# Patient Record
Sex: FEMALE | Race: WHITE | Employment: OTHER | URBAN - METROPOLITAN AREA
[De-identification: names, ages, dates, MRNs, and addresses within clinical notes are randomized per-mention and may not be internally consistent; named-entity substitution may affect disease eponyms.]

---

## 2017-01-06 LAB — INR BLD: 3.5

## 2017-01-09 ENCOUNTER — OFFICE VISIT (OUTPATIENT)
Dept: INTERNAL MEDICINE CLINIC | Age: 57
End: 2017-01-09

## 2017-01-09 VITALS
SYSTOLIC BLOOD PRESSURE: 122 MMHG | DIASTOLIC BLOOD PRESSURE: 82 MMHG | BODY MASS INDEX: 25.86 KG/M2 | OXYGEN SATURATION: 95 % | WEIGHT: 146 LBS | HEART RATE: 75 BPM

## 2017-01-09 DIAGNOSIS — Z79.01 ANTICOAGULANT LONG-TERM USE: ICD-10-CM

## 2017-01-09 DIAGNOSIS — M54.50 LOW BACK PAIN WITHOUT SCIATICA, UNSPECIFIED BACK PAIN LATERALITY, UNSPECIFIED CHRONICITY: Primary | ICD-10-CM

## 2017-01-09 PROCEDURE — 99213 OFFICE O/P EST LOW 20 MIN: CPT | Performed by: INTERNAL MEDICINE

## 2017-01-09 RX ORDER — CYCLOBENZAPRINE HCL 10 MG
10 TABLET ORAL EVERY 8 HOURS PRN
Qty: 30 TABLET | Refills: 0 | Status: SHIPPED | OUTPATIENT
Start: 2017-01-09 | End: 2017-01-19

## 2017-01-09 ASSESSMENT — ENCOUNTER SYMPTOMS
COUGH: 0
VOMITING: 0
CHEST TIGHTNESS: 0
NAUSEA: 0
SHORTNESS OF BREATH: 0
WHEEZING: 0
BACK PAIN: 1
CONSTIPATION: 0
ABDOMINAL DISTENTION: 0
DIARRHEA: 0

## 2017-01-10 ENCOUNTER — ANTI-COAG VISIT (OUTPATIENT)
Dept: CARDIOLOGY CLINIC | Age: 57
End: 2017-01-10

## 2017-01-17 RX ORDER — GABAPENTIN 300 MG/1
CAPSULE ORAL
Qty: 180 CAPSULE | Refills: 3 | Status: SHIPPED | OUTPATIENT
Start: 2017-01-17 | End: 2018-02-07 | Stop reason: SDUPTHER

## 2017-01-21 LAB — INR BLD: 2.5

## 2017-01-23 RX ORDER — WARFARIN SODIUM 2 MG/1
TABLET ORAL
Qty: 30 TABLET | Refills: 1 | Status: SHIPPED | OUTPATIENT
Start: 2017-01-23 | End: 2017-02-23 | Stop reason: SDUPTHER

## 2017-01-25 ENCOUNTER — ANTI-COAG VISIT (OUTPATIENT)
Dept: CARDIOLOGY CLINIC | Age: 57
End: 2017-01-25

## 2017-01-30 RX ORDER — SIMVASTATIN 40 MG
TABLET ORAL
Qty: 90 TABLET | Refills: 3 | Status: SHIPPED | OUTPATIENT
Start: 2017-01-30 | End: 2018-02-12 | Stop reason: SDUPTHER

## 2017-02-03 ENCOUNTER — ANTI-COAG VISIT (OUTPATIENT)
Dept: CARDIOLOGY CLINIC | Age: 57
End: 2017-02-03

## 2017-02-15 RX ORDER — ASPIRIN 325 MG
TABLET, DELAYED RELEASE (ENTERIC COATED) ORAL
Qty: 90 TABLET | Refills: 3 | Status: SHIPPED | OUTPATIENT
Start: 2017-02-15 | End: 2017-02-23 | Stop reason: SDUPTHER

## 2017-02-15 RX ORDER — NORTRIPTYLINE HYDROCHLORIDE 10 MG/1
CAPSULE ORAL
Qty: 270 CAPSULE | Refills: 3 | Status: SHIPPED | OUTPATIENT
Start: 2017-02-15 | End: 2017-08-24 | Stop reason: SDUPTHER

## 2017-02-18 LAB — INR BLD: 3.5

## 2017-02-22 ENCOUNTER — ANTI-COAG VISIT (OUTPATIENT)
Dept: CARDIOLOGY CLINIC | Age: 57
End: 2017-02-22

## 2017-02-23 ENCOUNTER — OFFICE VISIT (OUTPATIENT)
Dept: CARDIOLOGY CLINIC | Age: 57
End: 2017-02-23

## 2017-02-23 VITALS
HEART RATE: 58 BPM | WEIGHT: 148 LBS | BODY MASS INDEX: 26.22 KG/M2 | OXYGEN SATURATION: 98 % | HEIGHT: 63 IN | DIASTOLIC BLOOD PRESSURE: 80 MMHG | SYSTOLIC BLOOD PRESSURE: 124 MMHG

## 2017-02-23 DIAGNOSIS — Z95.2 H/O AORTIC VALVE REPLACEMENT: ICD-10-CM

## 2017-02-23 DIAGNOSIS — I10 ESSENTIAL HYPERTENSION: Primary | ICD-10-CM

## 2017-02-23 PROCEDURE — 99214 OFFICE O/P EST MOD 30 MIN: CPT | Performed by: INTERNAL MEDICINE

## 2017-02-23 RX ORDER — ASPIRIN 325 MG
TABLET, DELAYED RELEASE (ENTERIC COATED) ORAL
Qty: 90 TABLET | Refills: 3 | Status: SHIPPED | OUTPATIENT
Start: 2017-02-23 | End: 2018-12-03 | Stop reason: SDUPTHER

## 2017-02-23 RX ORDER — WARFARIN SODIUM 2 MG/1
TABLET ORAL
Qty: 90 TABLET | Refills: 1 | Status: SHIPPED | OUTPATIENT
Start: 2017-02-23 | End: 2019-01-01

## 2017-03-08 LAB — INR BLD: 3.5

## 2017-03-09 ENCOUNTER — TELEPHONE (OUTPATIENT)
Dept: CARDIOLOGY CLINIC | Age: 57
End: 2017-03-09

## 2017-03-09 ENCOUNTER — ANTI-COAG VISIT (OUTPATIENT)
Dept: CARDIOLOGY CLINIC | Age: 57
End: 2017-03-09

## 2017-03-20 RX ORDER — HYDROCHLOROTHIAZIDE 25 MG/1
TABLET ORAL
Qty: 90 TABLET | Refills: 1 | Status: SHIPPED | OUTPATIENT
Start: 2017-03-20 | End: 2017-09-23 | Stop reason: SDUPTHER

## 2017-03-20 RX ORDER — POTASSIUM CHLORIDE 750 MG/1
TABLET, EXTENDED RELEASE ORAL
Qty: 60 TABLET | Refills: 5 | Status: SHIPPED | OUTPATIENT
Start: 2017-03-20 | End: 2018-02-07 | Stop reason: SDUPTHER

## 2017-03-23 ENCOUNTER — ANTI-COAG VISIT (OUTPATIENT)
Dept: CARDIOLOGY CLINIC | Age: 57
End: 2017-03-23

## 2017-03-23 ENCOUNTER — TELEPHONE (OUTPATIENT)
Dept: CARDIOLOGY CLINIC | Age: 57
End: 2017-03-23

## 2017-03-23 LAB — INR BLD: 3.5

## 2017-04-07 ENCOUNTER — ANTI-COAG VISIT (OUTPATIENT)
Dept: CARDIOLOGY CLINIC | Age: 57
End: 2017-04-07

## 2017-04-07 ENCOUNTER — TELEPHONE (OUTPATIENT)
Dept: CARDIOLOGY CLINIC | Age: 57
End: 2017-04-07

## 2017-04-07 ENCOUNTER — OFFICE VISIT (OUTPATIENT)
Dept: INTERNAL MEDICINE CLINIC | Age: 57
End: 2017-04-07

## 2017-04-07 VITALS
BODY MASS INDEX: 26.39 KG/M2 | DIASTOLIC BLOOD PRESSURE: 80 MMHG | HEART RATE: 65 BPM | SYSTOLIC BLOOD PRESSURE: 120 MMHG | WEIGHT: 149 LBS | OXYGEN SATURATION: 98 %

## 2017-04-07 DIAGNOSIS — I10 ESSENTIAL HYPERTENSION: ICD-10-CM

## 2017-04-07 DIAGNOSIS — Z72.0 TOBACCO USE: ICD-10-CM

## 2017-04-07 DIAGNOSIS — M54.50 ACUTE RIGHT-SIDED LOW BACK PAIN WITHOUT SCIATICA: Primary | ICD-10-CM

## 2017-04-07 DIAGNOSIS — Z95.2 H/O AORTIC VALVE REPLACEMENT: ICD-10-CM

## 2017-04-07 LAB
INR BLD: 3.5
INR BLD: 3.5
PROTIME: NORMAL SECONDS

## 2017-04-07 PROCEDURE — 99214 OFFICE O/P EST MOD 30 MIN: CPT | Performed by: INTERNAL MEDICINE

## 2017-04-07 RX ORDER — METHYLPREDNISOLONE 4 MG/1
TABLET ORAL
Qty: 1 KIT | Refills: 0 | Status: SHIPPED | OUTPATIENT
Start: 2017-04-07 | End: 2017-04-13

## 2017-04-07 RX ORDER — METHOCARBAMOL 500 MG/1
500 TABLET, FILM COATED ORAL 4 TIMES DAILY
Qty: 40 TABLET | Refills: 0 | Status: SHIPPED | OUTPATIENT
Start: 2017-04-07 | End: 2017-04-17

## 2017-04-22 LAB — INR BLD: 3.5

## 2017-04-24 ENCOUNTER — ANTI-COAG VISIT (OUTPATIENT)
Dept: CARDIOLOGY CLINIC | Age: 57
End: 2017-04-24

## 2017-04-24 ENCOUNTER — TELEPHONE (OUTPATIENT)
Dept: CARDIOLOGY CLINIC | Age: 57
End: 2017-04-24

## 2017-05-03 RX ORDER — BUSPIRONE HYDROCHLORIDE 10 MG/1
TABLET ORAL
Qty: 45 TABLET | Refills: 5 | Status: SHIPPED | OUTPATIENT
Start: 2017-05-03 | End: 2018-01-30 | Stop reason: SDUPTHER

## 2017-05-08 LAB — INR BLD: 3.5

## 2017-05-09 ENCOUNTER — ANTI-COAG VISIT (OUTPATIENT)
Dept: CARDIOLOGY CLINIC | Age: 57
End: 2017-05-09

## 2017-05-09 ENCOUNTER — TELEPHONE (OUTPATIENT)
Dept: CARDIOLOGY CLINIC | Age: 57
End: 2017-05-09

## 2017-05-11 ENCOUNTER — OFFICE VISIT (OUTPATIENT)
Dept: INTERNAL MEDICINE CLINIC | Age: 57
End: 2017-05-11

## 2017-05-11 VITALS
WEIGHT: 148 LBS | DIASTOLIC BLOOD PRESSURE: 60 MMHG | BODY MASS INDEX: 26.22 KG/M2 | HEART RATE: 71 BPM | OXYGEN SATURATION: 93 % | HEIGHT: 63 IN | SYSTOLIC BLOOD PRESSURE: 138 MMHG

## 2017-05-11 DIAGNOSIS — I10 ESSENTIAL HYPERTENSION: Primary | ICD-10-CM

## 2017-05-11 DIAGNOSIS — Z72.0 TOBACCO USE: ICD-10-CM

## 2017-05-11 DIAGNOSIS — Z95.2 H/O AORTIC VALVE REPLACEMENT: ICD-10-CM

## 2017-05-11 DIAGNOSIS — E78.5 HYPERLIPIDEMIA, UNSPECIFIED HYPERLIPIDEMIA TYPE: ICD-10-CM

## 2017-05-11 PROCEDURE — 99213 OFFICE O/P EST LOW 20 MIN: CPT | Performed by: INTERNAL MEDICINE

## 2017-05-24 ENCOUNTER — TELEPHONE (OUTPATIENT)
Dept: CARDIOLOGY CLINIC | Age: 57
End: 2017-05-24

## 2017-05-24 ENCOUNTER — ANTI-COAG VISIT (OUTPATIENT)
Dept: CARDIOLOGY CLINIC | Age: 57
End: 2017-05-24

## 2017-05-24 LAB — INR BLD: 3.5

## 2017-06-08 LAB — INR BLD: 3.5

## 2017-06-09 ENCOUNTER — ANTI-COAG VISIT (OUTPATIENT)
Dept: CARDIOLOGY CLINIC | Age: 57
End: 2017-06-09

## 2017-06-09 ENCOUNTER — TELEPHONE (OUTPATIENT)
Dept: CARDIOLOGY CLINIC | Age: 57
End: 2017-06-09

## 2017-06-20 ENCOUNTER — TELEPHONE (OUTPATIENT)
Dept: CARDIOLOGY CLINIC | Age: 57
End: 2017-06-20

## 2017-06-20 ENCOUNTER — ANTI-COAG VISIT (OUTPATIENT)
Dept: CARDIOLOGY CLINIC | Age: 57
End: 2017-06-20

## 2017-06-20 LAB — INR BLD: 3.5

## 2017-06-29 ENCOUNTER — HOSPITAL ENCOUNTER (OUTPATIENT)
Dept: GENERAL RADIOLOGY | Age: 57
Discharge: HOME | End: 2017-06-29
Attending: INTERNAL MEDICINE | Admitting: INTERNAL MEDICINE

## 2017-06-29 DIAGNOSIS — E78.5 HYPERLIPIDEMIA, UNSPECIFIED HYPERLIPIDEMIA TYPE: ICD-10-CM

## 2017-06-29 DIAGNOSIS — I10 ESSENTIAL HYPERTENSION: ICD-10-CM

## 2017-06-29 DIAGNOSIS — Z95.2 H/O AORTIC VALVE REPLACEMENT: ICD-10-CM

## 2017-06-29 LAB
A/G RATIO: 1.6 (ref 1.1–2.2)
ALBUMIN SERPL-MCNC: 4.3 G/DL (ref 3.4–5)
ALP BLD-CCNC: 94 U/L (ref 40–129)
ALT SERPL-CCNC: 20 U/L (ref 10–40)
ANION GAP SERPL CALCULATED.3IONS-SCNC: 10 MMOL/L (ref 3–16)
AST SERPL-CCNC: 23 U/L (ref 15–37)
BILIRUB SERPL-MCNC: <0.2 MG/DL (ref 0–1)
BUN BLDV-MCNC: 16 MG/DL (ref 7–20)
CALCIUM SERPL-MCNC: 9.6 MG/DL (ref 8.3–10.6)
CHLORIDE BLD-SCNC: 101 MMOL/L (ref 99–110)
CHOLESTEROL, TOTAL: 152 MG/DL (ref 0–199)
CO2: 31 MMOL/L (ref 21–32)
CREAT SERPL-MCNC: 0.8 MG/DL (ref 0.6–1.1)
GFR AFRICAN AMERICAN: >60
GFR NON-AFRICAN AMERICAN: >60
GLOBULIN: 2.7 G/DL
GLUCOSE BLD-MCNC: 61 MG/DL (ref 70–99)
HCT VFR BLD CALC: 41 % (ref 36–48)
HDLC SERPL-MCNC: 36 MG/DL (ref 40–60)
HEMOGLOBIN: 13.4 G/DL (ref 12–16)
LDL CHOLESTEROL CALCULATED: 74 MG/DL
MCH RBC QN AUTO: 27 PG (ref 26–34)
MCHC RBC AUTO-ENTMCNC: 32.8 G/DL (ref 31–36)
MCV RBC AUTO: 82.5 FL (ref 80–100)
PDW BLD-RTO: 16.4 % (ref 12.4–15.4)
PLATELET # BLD: 182 K/UL (ref 135–450)
PMV BLD AUTO: 11.3 FL (ref 5–10.5)
POTASSIUM SERPL-SCNC: 3.8 MMOL/L (ref 3.5–5.1)
RBC # BLD: 4.97 M/UL (ref 4–5.2)
SODIUM BLD-SCNC: 142 MMOL/L (ref 136–145)
TOTAL PROTEIN: 7 G/DL (ref 6.4–8.2)
TRIGL SERPL-MCNC: 208 MG/DL (ref 0–150)
VLDLC SERPL CALC-MCNC: 42 MG/DL
WBC # BLD: 8.4 K/UL (ref 4–11)

## 2017-07-10 LAB — INR BLD: 3.5

## 2017-07-11 ENCOUNTER — ANTI-COAG VISIT (OUTPATIENT)
Dept: CARDIOLOGY CLINIC | Age: 57
End: 2017-07-11

## 2017-07-11 ENCOUNTER — TELEPHONE (OUTPATIENT)
Dept: CARDIOLOGY CLINIC | Age: 57
End: 2017-07-11

## 2017-07-25 LAB — INR BLD: 3.5

## 2017-07-27 ENCOUNTER — ANTI-COAG VISIT (OUTPATIENT)
Dept: CARDIOLOGY CLINIC | Age: 57
End: 2017-07-27

## 2017-08-01 ENCOUNTER — OFFICE VISIT (OUTPATIENT)
Dept: INTERNAL MEDICINE CLINIC | Age: 57
End: 2017-08-01

## 2017-08-01 VITALS
HEIGHT: 63 IN | OXYGEN SATURATION: 93 % | BODY MASS INDEX: 26.05 KG/M2 | WEIGHT: 147 LBS | DIASTOLIC BLOOD PRESSURE: 70 MMHG | HEART RATE: 62 BPM | SYSTOLIC BLOOD PRESSURE: 132 MMHG
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
## 2017-08-01 DIAGNOSIS — Z95.2 H/O AORTIC VALVE REPLACEMENT: ICD-10-CM

## 2017-08-01 DIAGNOSIS — Z72.0 TOBACCO USE: ICD-10-CM

## 2017-08-01 DIAGNOSIS — M25.561 ACUTE PAIN OF RIGHT KNEE: ICD-10-CM

## 2017-08-01 DIAGNOSIS — E78.5 HYPERLIPIDEMIA, UNSPECIFIED HYPERLIPIDEMIA TYPE: ICD-10-CM

## 2017-08-01 DIAGNOSIS — M25.551 PAIN OF RIGHT HIP JOINT: ICD-10-CM

## 2017-08-01 DIAGNOSIS — I10 ESSENTIAL HYPERTENSION: ICD-10-CM

## 2017-08-01 PROCEDURE — 99214 OFFICE O/P EST MOD 30 MIN: CPT | Performed by: INTERNAL MEDICINE

## 2017-08-01 RX ORDER — PREDNISONE 10 MG/1
10 TABLET ORAL DAILY
Qty: 40 TABLET | Refills: 0 | Status: SHIPPED | OUTPATIENT
Start: 2017-08-01 | End: 2017-11-10 | Stop reason: ALTCHOICE

## 2017-08-01 RX ORDER — TRAMADOL HYDROCHLORIDE 50 MG/1
50 TABLET ORAL EVERY 8 HOURS PRN
Qty: 20 TABLET | Refills: 0 | Status: SHIPPED | OUTPATIENT
Start: 2017-08-01 | End: 2017-08-11

## 2017-08-10 ENCOUNTER — ANTI-COAG VISIT (OUTPATIENT)
Dept: CARDIOLOGY CLINIC | Age: 57
End: 2017-08-10

## 2017-08-10 ENCOUNTER — TELEPHONE (OUTPATIENT)
Dept: CARDIOLOGY CLINIC | Age: 57
End: 2017-08-10

## 2017-08-10 LAB — INR BLD: 3.5

## 2017-08-20 LAB — INR BLD: 4.8

## 2017-08-21 ENCOUNTER — ANTI-COAG VISIT (OUTPATIENT)
Dept: CARDIOLOGY CLINIC | Age: 57
End: 2017-08-21

## 2017-08-21 ENCOUNTER — TELEPHONE (OUTPATIENT)
Dept: CARDIOLOGY CLINIC | Age: 57
End: 2017-08-21

## 2017-09-05 ENCOUNTER — TELEPHONE (OUTPATIENT)
Dept: CARDIOLOGY CLINIC | Age: 57
End: 2017-09-05

## 2017-09-05 LAB — INR BLD: 2.6

## 2017-09-06 ENCOUNTER — TELEPHONE (OUTPATIENT)
Dept: CARDIOLOGY CLINIC | Age: 57
End: 2017-09-06

## 2017-09-06 ENCOUNTER — ANTI-COAG VISIT (OUTPATIENT)
Dept: CARDIOLOGY CLINIC | Age: 57
End: 2017-09-06

## 2017-09-11 LAB — INR BLD: 3.1

## 2017-09-12 ENCOUNTER — ANTI-COAG VISIT (OUTPATIENT)
Dept: CARDIOLOGY CLINIC | Age: 57
End: 2017-09-12

## 2017-09-12 ENCOUNTER — TELEPHONE (OUTPATIENT)
Dept: CARDIOLOGY CLINIC | Age: 57
End: 2017-09-12

## 2017-09-21 LAB — INR BLD: 3.1

## 2017-09-22 ENCOUNTER — ANTI-COAG VISIT (OUTPATIENT)
Dept: CARDIOLOGY CLINIC | Age: 57
End: 2017-09-22

## 2017-09-25 RX ORDER — HYDROCHLOROTHIAZIDE 25 MG/1
TABLET ORAL
Qty: 90 TABLET | Refills: 1 | Status: SHIPPED | OUTPATIENT
Start: 2017-09-25 | End: 2018-04-01 | Stop reason: SDUPTHER

## 2017-09-26 RX ORDER — WARFARIN SODIUM 2 MG/1
TABLET ORAL
Qty: 90 TABLET | Refills: 0 | Status: SHIPPED | OUTPATIENT
Start: 2017-09-26 | End: 2017-12-31 | Stop reason: SDUPTHER

## 2017-10-01 LAB — INR BLD: 3.1

## 2017-10-02 ENCOUNTER — ANTI-COAG VISIT (OUTPATIENT)
Dept: CARDIOLOGY CLINIC | Age: 57
End: 2017-10-02

## 2017-10-02 ENCOUNTER — TELEPHONE (OUTPATIENT)
Dept: CARDIOLOGY CLINIC | Age: 57
End: 2017-10-02

## 2017-10-09 ENCOUNTER — OFFICE VISIT (OUTPATIENT)
Dept: ORTHOPEDIC SURGERY | Age: 57
End: 2017-10-09

## 2017-10-09 VITALS — BODY MASS INDEX: 25.35 KG/M2 | WEIGHT: 143.08 LBS | HEIGHT: 63 IN

## 2017-10-09 DIAGNOSIS — M79.644 FINGER PAIN, RIGHT: Primary | ICD-10-CM

## 2017-10-09 DIAGNOSIS — S62.524A CLOSED NONDISPLACED FRACTURE OF DISTAL PHALANX OF RIGHT THUMB, INITIAL ENCOUNTER: ICD-10-CM

## 2017-10-09 PROCEDURE — 73140 X-RAY EXAM OF FINGER(S): CPT | Performed by: ORTHOPAEDIC SURGERY

## 2017-10-09 PROCEDURE — 99214 OFFICE O/P EST MOD 30 MIN: CPT | Performed by: ORTHOPAEDIC SURGERY

## 2017-10-09 NOTE — PROGRESS NOTES
of the right thumb just proximal to the interphalangeal joint no significant swelling    Right Hand Examination:  Inspection:  No significant swelling  Finger Range of Motion:  Full extension of the interphalangeal joint 30° flexion. MP joint lacks about 15° of extension and has 30° flexion  Wrist Range of Motion:  Normal  Vascular Exam:  Radial and ulnar arterial pulses are normal.  Isaac's Test reveals patent palmar arch. Neurologic Exam: Negative Tinel's and Phalen's  Intrinsic Muscle Strength: Normal   Extrinsic Muscle Strength: Normal  Special Tests:  IP joint is stable to dorsal volar radial and ulnar stress no real tenderness now to palpation over the IP joint    Left Hand Examination:  Inspection:  Comparison of the size of the 2 thumbs show moderate arthritic change at the interphalangeal joint  Finger Range of Motion:  Normal  Wrist Range of Motion:  Full  Vascular Exam:  Radial and ulnar arterial pulses are normal.  Isaac's Test reveals patent palmar arch. Neurologic Exam: No numbness or tingling  Intrinsic Muscle Strength:  Normal  Extrinsic Muscle Strength: Normal  Special Tests:      Neck Exam:  There is no paraspinous tenderness. Neck compression and neck tilt test are negative. No significant pain. Additional Comments:     Additional Examinations:  X-Ray Findings: PA lateral and oblique x-rays of the right thumb show healed transverse fracture base distal phalanx.   There is underlying arthritis unrelated to her injury in both the MP and interphalangeal joints of the thumb CMC joint is relatively well preserved   Additional Diagnostic Test Findings:    Office Procedures:    Orders Placed This Encounter   Procedures    XR FINGER RIGHT (MIN 2 VIEWS)     [id number]     Order Specific Question:   Reason for exam:     Answer:   Pain

## 2017-10-15 LAB — INR BLD: 3

## 2017-10-16 ENCOUNTER — ANTI-COAG VISIT (OUTPATIENT)
Dept: CARDIOLOGY CLINIC | Age: 57
End: 2017-10-16

## 2017-10-16 ENCOUNTER — TELEPHONE (OUTPATIENT)
Dept: CARDIOLOGY CLINIC | Age: 57
End: 2017-10-16

## 2017-10-16 NOTE — TELEPHONE ENCOUNTER
Please call pt. INR is 3.0. Per protocol, no changes. Continue 2 mg daily. Recheck 2 week(s).  Provider notified. ~ Myrtle Harvey RN

## 2017-10-30 ENCOUNTER — ANTI-COAG VISIT (OUTPATIENT)
Dept: CARDIOLOGY CLINIC | Age: 57
End: 2017-10-30

## 2017-10-30 ENCOUNTER — TELEPHONE (OUTPATIENT)
Dept: CARDIOLOGY CLINIC | Age: 57
End: 2017-10-30

## 2017-10-30 LAB — INR BLD: 3.4

## 2017-11-08 ENCOUNTER — HOSPITAL ENCOUNTER (OUTPATIENT)
Dept: GENERAL RADIOLOGY | Age: 57
Discharge: HOME | End: 2017-11-08
Attending: INTERNAL MEDICINE | Admitting: INTERNAL MEDICINE

## 2017-11-08 DIAGNOSIS — E78.5 HYPERLIPIDEMIA, UNSPECIFIED HYPERLIPIDEMIA TYPE: ICD-10-CM

## 2017-11-08 DIAGNOSIS — I10 ESSENTIAL HYPERTENSION: ICD-10-CM

## 2017-11-08 LAB
A/G RATIO: 1.5 (ref 1.1–2.2)
ALBUMIN SERPL-MCNC: 4.1 G/DL (ref 3.4–5)
ALP BLD-CCNC: 97 U/L (ref 40–129)
ALT SERPL-CCNC: 15 U/L (ref 10–40)
ANION GAP SERPL CALCULATED.3IONS-SCNC: 13 MMOL/L (ref 3–16)
AST SERPL-CCNC: 21 U/L (ref 15–37)
BILIRUB SERPL-MCNC: <0.2 MG/DL (ref 0–1)
BUN BLDV-MCNC: 11 MG/DL (ref 7–20)
CALCIUM SERPL-MCNC: 8.9 MG/DL (ref 8.3–10.6)
CHLORIDE BLD-SCNC: 97 MMOL/L (ref 99–110)
CHOLESTEROL, TOTAL: 171 MG/DL (ref 0–199)
CO2: 30 MMOL/L (ref 21–32)
CREAT SERPL-MCNC: 0.7 MG/DL (ref 0.6–1.1)
GFR AFRICAN AMERICAN: >60
GFR NON-AFRICAN AMERICAN: >60
GLOBULIN: 2.8 G/DL
GLUCOSE BLD-MCNC: 107 MG/DL (ref 70–99)
HDLC SERPL-MCNC: 33 MG/DL (ref 40–60)
LDL CHOLESTEROL CALCULATED: 84 MG/DL
POTASSIUM SERPL-SCNC: 3.8 MMOL/L (ref 3.5–5.1)
SODIUM BLD-SCNC: 140 MMOL/L (ref 136–145)
TOTAL PROTEIN: 6.9 G/DL (ref 6.4–8.2)
TRIGL SERPL-MCNC: 269 MG/DL (ref 0–150)
VLDLC SERPL CALC-MCNC: 54 MG/DL

## 2017-11-10 ENCOUNTER — TELEPHONE (OUTPATIENT)
Dept: CARDIOLOGY CLINIC | Age: 57
End: 2017-11-10

## 2017-11-10 ENCOUNTER — ANTI-COAG VISIT (OUTPATIENT)
Dept: CARDIOLOGY CLINIC | Age: 57
End: 2017-11-10

## 2017-11-10 ENCOUNTER — OFFICE VISIT (OUTPATIENT)
Dept: INTERNAL MEDICINE CLINIC | Age: 57
End: 2017-11-10

## 2017-11-10 VITALS
DIASTOLIC BLOOD PRESSURE: 60 MMHG | HEIGHT: 63 IN | SYSTOLIC BLOOD PRESSURE: 120 MMHG | BODY MASS INDEX: 25.34 KG/M2 | WEIGHT: 143 LBS

## 2017-11-10 DIAGNOSIS — M19.90 ARTHRITIS: ICD-10-CM

## 2017-11-10 DIAGNOSIS — E78.5 HYPERLIPIDEMIA, UNSPECIFIED HYPERLIPIDEMIA TYPE: ICD-10-CM

## 2017-11-10 DIAGNOSIS — I10 ESSENTIAL HYPERTENSION: Primary | ICD-10-CM

## 2017-11-10 DIAGNOSIS — Z95.2 H/O AORTIC VALVE REPLACEMENT: ICD-10-CM

## 2017-11-10 DIAGNOSIS — Z72.0 TOBACCO USE: ICD-10-CM

## 2017-11-10 DIAGNOSIS — Z23 NEED FOR IMMUNIZATION AGAINST INFLUENZA: ICD-10-CM

## 2017-11-10 DIAGNOSIS — R53.83 FATIGUE, UNSPECIFIED TYPE: ICD-10-CM

## 2017-11-10 LAB — INR BLD: 3.5

## 2017-11-10 PROCEDURE — 90471 IMMUNIZATION ADMIN: CPT | Performed by: INTERNAL MEDICINE

## 2017-11-10 PROCEDURE — 90688 IIV4 VACCINE SPLT 0.5 ML IM: CPT | Performed by: INTERNAL MEDICINE

## 2017-11-10 PROCEDURE — 99214 OFFICE O/P EST MOD 30 MIN: CPT | Performed by: INTERNAL MEDICINE

## 2017-11-10 RX ORDER — FLUTICASONE PROPIONATE 50 MCG
SPRAY, SUSPENSION (ML) NASAL
Qty: 1 BOTTLE | Refills: 1 | Status: SHIPPED | OUTPATIENT
Start: 2017-11-10 | End: 2017-12-08 | Stop reason: SDUPTHER

## 2017-11-10 NOTE — PROGRESS NOTES
11/10/17: INR is 3.5. Per protocol, no changes. Continue 2 mg daily. Recheck 2 week(s).  Provider notified. ~ Yg Landaverde RN

## 2017-11-10 NOTE — PROGRESS NOTES
Subjective:      Patient ID: Niurka Figueredo is a 56 y.o. female. CC: Check HTN and hyperlipidemia  HPI: Reviewed patient in the emergency room 9/26/2017: Right thumb smashed when closing a safe. DX: Nondisplaced fracture distal phalanx the right thumb. 10/9/2017: Seen by Dr. Bonilla Marcelle:  Healed nondisplaced fracture right thumb distal phalanx with healed overlying laceration. Conservative treatment. Patient complains for ears feeling clogged up bilaterally. Relates a right ear infection was been treated. Has not seen ENT physician. Patient will [facility] of arthritis. Patient is feeling tired in the afternoon. States has adequate sleep. Does snore but no complaint of acne spells per  . Medicines and allergies reviewed  Health maintenance reviewed  Reviewed laboratory data 11/8/2017: Chemistry panel: Glucose: 107. Lipid panel: Total cholesterol: 171. LDL cholesterol: 84. Triglycerides: 269. Review of Systems    Review of Systems   Constitutional:  Feels tired in the afternoon. Question of low blood pressure or bradycardia? HENT: negative   EYES: negative   Respiratory: negative   Gastrointestinal: negative   Endocrine: negative   Musculoskeletal: right thumb trauma is noted above. Skin: negative   Allergic/Immunological: negative   Hematological: negative   Psychiatric/Behavorial: negative   CV: negative   CNS: negative   :Negative   S/E:Negative  Renal: Negative      Objective:   Physical Exam: Lungs: Clear to auscultation. CV: S1-S2 normal.  LAVINIA. Carotid: No bruit. Head/neck: Neck: No lymphadenopathy. Thyroid: Not palpable. Eyes: EOMI, PERRLA without nystagmus. Ears: Canals clear. TM with fullness but not erythematous and no active drainage. Throat: Posterior pharynx narrow. No exudates or erythema. Spine/extremities: No ankle edema. Skin: No rash.   CNS:Patient's alert, cooperative, moves all 4 limbs, ambulates without difficulty, no slurred speech, no facial droop, good

## 2017-11-10 NOTE — PROGRESS NOTES
Vaccine Information Sheet, \"Influenza - Inactivated\"  given to Mario Gupta, or parent/legal guardian of  Mario uGpta and verbalized understanding. Patient responses:    Have you ever had a reaction to a flu vaccine? No  Are you able to eat eggs without adverse effects? Yes  Do you have any current illness? No  Have you ever had Guillian Wisner Syndrome? No    Flu vaccine given per order. Please see immunization tab.

## 2017-11-21 ENCOUNTER — TELEPHONE (OUTPATIENT)
Dept: INTERNAL MEDICINE CLINIC | Age: 57
End: 2017-11-21

## 2017-11-21 ENCOUNTER — OFFICE VISIT (OUTPATIENT)
Dept: INTERNAL MEDICINE CLINIC | Age: 57
End: 2017-11-21

## 2017-11-21 VITALS
DIASTOLIC BLOOD PRESSURE: 66 MMHG | BODY MASS INDEX: 25.69 KG/M2 | SYSTOLIC BLOOD PRESSURE: 124 MMHG | HEART RATE: 76 BPM | WEIGHT: 145 LBS | OXYGEN SATURATION: 93 % | HEIGHT: 63 IN | TEMPERATURE: 99.8 F

## 2017-11-21 DIAGNOSIS — Z72.0 TOBACCO USE: ICD-10-CM

## 2017-11-21 DIAGNOSIS — J45.50 SEVERE PERSISTENT ASTHMATIC BRONCHITIS WITHOUT COMPLICATION: Primary | ICD-10-CM

## 2017-11-21 DIAGNOSIS — I10 ESSENTIAL HYPERTENSION: ICD-10-CM

## 2017-11-21 PROCEDURE — 99213 OFFICE O/P EST LOW 20 MIN: CPT | Performed by: INTERNAL MEDICINE

## 2017-11-21 RX ORDER — GUAIFENESIN AND CODEINE PHOSPHATE 100; 10 MG/5ML; MG/5ML
5 SOLUTION ORAL 3 TIMES DAILY PRN
Qty: 120 BOTTLE | Refills: 0 | Status: SHIPPED | OUTPATIENT
Start: 2017-11-21 | End: 2018-02-14 | Stop reason: ALTCHOICE

## 2017-11-21 RX ORDER — PREDNISONE 10 MG/1
TABLET ORAL
Qty: 40 TABLET | Refills: 0 | Status: SHIPPED | OUTPATIENT
Start: 2017-11-21 | End: 2018-01-04 | Stop reason: ALTCHOICE

## 2017-11-21 RX ORDER — LEVOFLOXACIN 500 MG/1
500 TABLET, FILM COATED ORAL DAILY
Qty: 10 TABLET | Refills: 0 | Status: SHIPPED | OUTPATIENT
Start: 2017-11-21 | End: 2017-12-01

## 2017-11-21 NOTE — TELEPHONE ENCOUNTER
Call tell take the [address] [place] [address] Rd.   Hold the Pamelor/nortriptyline for 5 days  Dr. day

## 2017-11-21 NOTE — TELEPHONE ENCOUNTER
[facility] calling to let you know that there is a drug interaction between [address] and Pamelor . Both cause QT interval and want to know if you still want her to take them together. [id number] - [place], [place] - [address] [facility] - P [phone] - F [phone]  [address] [place] [address]  Phone: [phone] Fax: [phone]

## 2017-11-28 ENCOUNTER — OFFICE VISIT (OUTPATIENT)
Dept: INTERNAL MEDICINE CLINIC | Age: 57
End: 2017-11-28

## 2017-11-28 VITALS
HEIGHT: 63 IN | TEMPERATURE: 98.1 F | BODY MASS INDEX: 24.63 KG/M2 | WEIGHT: 139 LBS | DIASTOLIC BLOOD PRESSURE: 78 MMHG | SYSTOLIC BLOOD PRESSURE: 124 MMHG

## 2017-11-28 DIAGNOSIS — J40 BRONCHITIS: Primary | ICD-10-CM

## 2017-11-28 DIAGNOSIS — I10 ESSENTIAL HYPERTENSION: ICD-10-CM

## 2017-11-28 PROCEDURE — 99213 OFFICE O/P EST LOW 20 MIN: CPT | Performed by: NURSE PRACTITIONER

## 2017-11-28 ASSESSMENT — ENCOUNTER SYMPTOMS
COUGH: 1
BLOOD IN STOOL: 0
SHORTNESS OF BREATH: 0
CONSTIPATION: 0
ABDOMINAL PAIN: 0
COLOR CHANGE: 0

## 2017-11-28 NOTE — PROGRESS NOTES
HIGH CHOLESTEROL 90 tablet 3    gabapentin (NEURONTIN) 300 MG capsule TAKE ONE CAPSULE BY MOUTH TWICE A DAY FOR NEUROPATHY 180 capsule 3    metoprolol tartrate (LOPRESSOR) 25 MG tablet TAKE 1/2 TABLET BY MOUTH TWICE A DAY FOR HIGH BLOOD PRESSURE 60 tablet 6    MINIVELLE 0.05 MG/24HR   11    fluticasone (FLONASE) 50 MCG/ACT nasal spray 2 sprays by Nasal route daily. 1 Bottle 0    meclizine (ANTIVERT) 25 MG tablet Take 1 tablet by mouth 3 times daily as needed for Dizziness. 20 tablet 0    albuterol (PROVENTIL HFA;VENTOLIN HFA) 108 (90 BASE) MCG/ACT inhaler Inhale 2 puffs into the lungs every 6 hours as needed for Wheezing. 1 Inhaler 3    ibuprofen (ADVIL;MOTRIN) 800 MG tablet Take 1 tablet by mouth every 8 hours as needed for Pain 20 tablet 0     No current facility-administered medications for this visit. Social History     Social History    Marital status:      Spouse name: N/A    Number of children: N/A    Years of education: N/A     Occupational History    Not on file. Social History Main Topics    Smoking status: Current Every Day Smoker     Packs/day: 1.00     Years: 20.00    Smokeless tobacco: Never Used    Alcohol use Yes      Comment: 2 x months    Drug use: No    Sexual activity: Yes     Partners: Male     Other Topics Concern    Not on file     Social History Narrative    No narrative on file       Family History   Problem Relation Age of Onset    Stroke Mother     Pacemaker Mother     Stroke Father     Heart Disease Father 61     MI    High Blood Pressure Father        Past Medical History:   Diagnosis Date    Anxiety     Anxiety     Aortic valve defect 2008    congenital,Ao replaced,Coronary angiogram= Normal    Depression     HTN (hypertension)     Hyperlipidemia     Hyperlipidemia     Hypertension     LONG TERM ANTICOAGULENT USE     Palpitations     Pneumonia     Psoriasis        Review of Systems   Constitutional: Negative for chills and fever.

## 2017-11-29 LAB — INR BLD: 2.5

## 2017-11-30 ENCOUNTER — TELEPHONE (OUTPATIENT)
Dept: CARDIOLOGY CLINIC | Age: 57
End: 2017-11-30

## 2017-11-30 ENCOUNTER — TELEPHONE (OUTPATIENT)
Dept: INTERNAL MEDICINE CLINIC | Age: 57
End: 2017-11-30

## 2017-11-30 ENCOUNTER — ANTI-COAG VISIT (OUTPATIENT)
Dept: CARDIOLOGY CLINIC | Age: 57
End: 2017-11-30

## 2017-11-30 NOTE — TELEPHONE ENCOUNTER
Aura Acosta, you completed FMLA papers for Colletta Higashi but question #1 was not answered. Can you please answer complete that question and give the form to spouse who has appointment on Monday? ??

## 2017-11-30 NOTE — TELEPHONE ENCOUNTER
Please call pt. INR is 2.5. Per protocol, no changes. Continue 2 mg daily. Recheck 2 week(s).  Provider notified. ~ Adeel Leija RN

## 2017-12-05 ENCOUNTER — TELEPHONE (OUTPATIENT)
Dept: INTERNAL MEDICINE CLINIC | Age: 57
End: 2017-12-05

## 2017-12-05 NOTE — TELEPHONE ENCOUNTER
Marry Holley called today indicating that the Section II information was still blank - the original form was returned to her with Section II incomplete. Please call Mitchel Su at [phone] when she can  a completed form.

## 2017-12-07 ENCOUNTER — TELEPHONE (OUTPATIENT)
Dept: INTERNAL MEDICINE CLINIC | Age: 57
End: 2017-12-07

## 2017-12-08 RX ORDER — FLUTICASONE PROPIONATE 50 MCG
SPRAY, SUSPENSION (ML) NASAL
Qty: 3 BOTTLE | Refills: 0 | Status: SHIPPED | OUTPATIENT
Start: 2017-12-08 | End: 2018-02-14 | Stop reason: SDUPTHER

## 2017-12-14 LAB — INR BLD: 3.5

## 2017-12-15 ENCOUNTER — ANTI-COAG VISIT (OUTPATIENT)
Dept: CARDIOLOGY CLINIC | Age: 57
End: 2017-12-15

## 2017-12-15 ENCOUNTER — TELEPHONE (OUTPATIENT)
Dept: CARDIOLOGY CLINIC | Age: 57
End: 2017-12-15

## 2018-01-02 ENCOUNTER — TELEPHONE (OUTPATIENT)
Dept: CARDIOLOGY CLINIC | Age: 58
End: 2018-01-02

## 2018-01-02 ENCOUNTER — ANTI-COAG VISIT (OUTPATIENT)
Dept: CARDIOLOGY CLINIC | Age: 58
End: 2018-01-02

## 2018-01-02 LAB — INR BLD: 3.5

## 2018-01-02 RX ORDER — WARFARIN SODIUM 2 MG/1
TABLET ORAL
Qty: 90 TABLET | Refills: 0 | Status: SHIPPED | OUTPATIENT
Start: 2018-01-02 | End: 2018-02-07 | Stop reason: ALTCHOICE

## 2018-01-04 ENCOUNTER — OFFICE VISIT (OUTPATIENT)
Dept: INTERNAL MEDICINE CLINIC | Age: 58
End: 2018-01-04

## 2018-01-04 VITALS
WEIGHT: 143 LBS | BODY MASS INDEX: 25.33 KG/M2 | SYSTOLIC BLOOD PRESSURE: 138 MMHG | OXYGEN SATURATION: 97 % | DIASTOLIC BLOOD PRESSURE: 78 MMHG | HEART RATE: 69 BPM

## 2018-01-04 DIAGNOSIS — M54.2 NECK PAIN: Primary | ICD-10-CM

## 2018-01-04 DIAGNOSIS — I10 ESSENTIAL HYPERTENSION: ICD-10-CM

## 2018-01-04 DIAGNOSIS — Z95.2 H/O AORTIC VALVE REPLACEMENT: ICD-10-CM

## 2018-01-04 PROCEDURE — 99214 OFFICE O/P EST MOD 30 MIN: CPT | Performed by: INTERNAL MEDICINE

## 2018-01-04 RX ORDER — CYCLOBENZAPRINE HCL 10 MG
10 TABLET ORAL 3 TIMES DAILY PRN
Qty: 30 TABLET | Refills: 0 | Status: SHIPPED | OUTPATIENT
Start: 2018-01-04 | End: 2018-01-14

## 2018-01-04 RX ORDER — PREDNISONE 10 MG/1
TABLET ORAL
Qty: 40 TABLET | Refills: 0 | Status: SHIPPED | OUTPATIENT
Start: 2018-01-04 | End: 2018-02-07 | Stop reason: ALTCHOICE

## 2018-01-04 ASSESSMENT — PATIENT HEALTH QUESTIONNAIRE - PHQ9
SUM OF ALL RESPONSES TO PHQ QUESTIONS 1-9: 0
2. FEELING DOWN, DEPRESSED OR HOPELESS: 0
1. LITTLE INTEREST OR PLEASURE IN DOING THINGS: 0
SUM OF ALL RESPONSES TO PHQ9 QUESTIONS 1 & 2: 0

## 2018-01-04 ASSESSMENT — ENCOUNTER SYMPTOMS
NAUSEA: 0
CHEST TIGHTNESS: 0
SHORTNESS OF BREATH: 0
DIARRHEA: 0
BACK PAIN: 0
WHEEZING: 0
ABDOMINAL DISTENTION: 0
VOMITING: 0
COUGH: 0
CONSTIPATION: 0

## 2018-01-19 ENCOUNTER — TELEPHONE (OUTPATIENT)
Dept: CARDIOLOGY CLINIC | Age: 58
End: 2018-01-19

## 2018-01-19 ENCOUNTER — ANTI-COAG VISIT (OUTPATIENT)
Dept: CARDIOLOGY CLINIC | Age: 58
End: 2018-01-19

## 2018-01-19 LAB — INR BLD: 3.5

## 2018-01-19 NOTE — PROGRESS NOTES
01/19/18: INR is 3.5. Per protocol, no changes. Continue 2 mg daily. Recheck 2 week(s).  Provider notified. ~ Damaris Olivo RN

## 2018-01-23 ENCOUNTER — HOSPITAL ENCOUNTER (OUTPATIENT)
Dept: OTHER | Age: 58
Discharge: HOME | End: 2018-01-23
Attending: INTERNAL MEDICINE | Admitting: INTERNAL MEDICINE

## 2018-01-23 ENCOUNTER — OFFICE VISIT (OUTPATIENT)
Dept: INTERNAL MEDICINE CLINIC | Age: 58
End: 2018-01-23

## 2018-01-23 VITALS
SYSTOLIC BLOOD PRESSURE: 118 MMHG | HEART RATE: 73 BPM | BODY MASS INDEX: 25.69 KG/M2 | OXYGEN SATURATION: 95 % | DIASTOLIC BLOOD PRESSURE: 84 MMHG | WEIGHT: 145 LBS

## 2018-01-23 DIAGNOSIS — M54.6 ACUTE RIGHT-SIDED THORACIC BACK PAIN: ICD-10-CM

## 2018-01-23 DIAGNOSIS — M54.6 THORACIC BACK PAIN, UNSPECIFIED BACK PAIN LATERALITY, UNSPECIFIED CHRONICITY: ICD-10-CM

## 2018-01-23 DIAGNOSIS — E78.5 HYPERLIPIDEMIA, UNSPECIFIED HYPERLIPIDEMIA TYPE: ICD-10-CM

## 2018-01-23 DIAGNOSIS — Z72.0 TOBACCO USE: ICD-10-CM

## 2018-01-23 DIAGNOSIS — M54.6 ACUTE RIGHT-SIDED THORACIC BACK PAIN: Primary | ICD-10-CM

## 2018-01-23 DIAGNOSIS — I10 ESSENTIAL HYPERTENSION: ICD-10-CM

## 2018-01-23 LAB
D DIMER: <200 NG/ML DDU (ref 0–229)
HCT VFR BLD CALC: 41.5 % (ref 36–48)
HEMOGLOBIN: 13.6 G/DL (ref 12–16)
MCH RBC QN AUTO: 26.9 PG (ref 26–34)
MCHC RBC AUTO-ENTMCNC: 32.8 G/DL (ref 31–36)
MCV RBC AUTO: 82 FL (ref 80–100)
PDW BLD-RTO: 17.2 % (ref 12.4–15.4)
PLATELET # BLD: 169 K/UL (ref 135–450)
PMV BLD AUTO: 10.7 FL (ref 5–10.5)
RBC # BLD: 5.07 M/UL (ref 4–5.2)
SEDIMENTATION RATE, ERYTHROCYTE: 28 MM/HR (ref 0–30)
WBC # BLD: 11.4 K/UL (ref 4–11)

## 2018-01-23 PROCEDURE — 99214 OFFICE O/P EST MOD 30 MIN: CPT | Performed by: INTERNAL MEDICINE

## 2018-01-25 ENCOUNTER — TELEPHONE (OUTPATIENT)
Dept: INTERNAL MEDICINE CLINIC | Age: 58
End: 2018-01-25

## 2018-01-25 NOTE — TELEPHONE ENCOUNTER
Please call patient: From 1/23/2018: Lab: CBC: Slightly elevated white blood cell count but no anemia. ESR: 20: Normal.  D - dimer test: Normal.     X-rays: Chest x-ray: No acute process. Thoracic spine: Arthritis. Is She feeling any better?   Dr. Bee Berger

## 2018-01-26 ENCOUNTER — OFFICE VISIT (OUTPATIENT)
Dept: INTERNAL MEDICINE CLINIC | Age: 58
End: 2018-01-26

## 2018-01-26 VITALS
HEART RATE: 80 BPM | SYSTOLIC BLOOD PRESSURE: 138 MMHG | WEIGHT: 145.2 LBS | HEIGHT: 63 IN | BODY MASS INDEX: 25.73 KG/M2 | DIASTOLIC BLOOD PRESSURE: 76 MMHG | OXYGEN SATURATION: 99 %

## 2018-01-26 DIAGNOSIS — G89.29 CHRONIC THORACIC BACK PAIN, UNSPECIFIED BACK PAIN LATERALITY: Primary | ICD-10-CM

## 2018-01-26 DIAGNOSIS — M54.6 CHRONIC THORACIC BACK PAIN, UNSPECIFIED BACK PAIN LATERALITY: Primary | ICD-10-CM

## 2018-01-26 PROCEDURE — 99213 OFFICE O/P EST LOW 20 MIN: CPT | Performed by: FAMILY MEDICINE

## 2018-01-26 RX ORDER — TRAMADOL HYDROCHLORIDE 50 MG/1
50 TABLET ORAL EVERY 8 HOURS PRN
Qty: 21 TABLET | Refills: 0 | Status: SHIPPED | OUTPATIENT
Start: 2018-01-26 | End: 2018-02-02

## 2018-01-30 RX ORDER — BUSPIRONE HYDROCHLORIDE 10 MG/1
TABLET ORAL
Qty: 45 TABLET | Refills: 5 | Status: SHIPPED | OUTPATIENT
Start: 2018-01-30 | End: 2018-09-07 | Stop reason: SDUPTHER

## 2018-01-31 ENCOUNTER — TELEPHONE (OUTPATIENT)
Dept: ORTHOPEDIC SURGERY | Age: 58
End: 2018-01-31

## 2018-01-31 ENCOUNTER — OFFICE VISIT (OUTPATIENT)
Dept: ORTHOPEDIC SURGERY | Age: 58
End: 2018-01-31

## 2018-01-31 VITALS
WEIGHT: 145.28 LBS | SYSTOLIC BLOOD PRESSURE: 113 MMHG | DIASTOLIC BLOOD PRESSURE: 78 MMHG | HEIGHT: 63 IN | BODY MASS INDEX: 25.74 KG/M2 | HEART RATE: 82 BPM

## 2018-01-31 DIAGNOSIS — M47.814 SPONDYLOSIS OF THORACIC REGION WITHOUT MYELOPATHY OR RADICULOPATHY: ICD-10-CM

## 2018-01-31 DIAGNOSIS — M54.50 ACUTE LOW BACK PAIN WITHOUT SCIATICA, UNSPECIFIED BACK PAIN LATERALITY: Primary | ICD-10-CM

## 2018-01-31 DIAGNOSIS — M47.816 LUMBAR SPONDYLOSIS: ICD-10-CM

## 2018-01-31 PROCEDURE — 99244 OFF/OP CNSLTJ NEW/EST MOD 40: CPT | Performed by: PHYSICAL MEDICINE & REHABILITATION

## 2018-01-31 NOTE — PROGRESS NOTES
New Patient: SPINE    CHIEF COMPLAINT:    Chief Complaint   Patient presents with    Back Pain     middle to lower back pain x1 mth, no known injury had x-rays at [facility], has pain in both hips       HISTORY OF PRESENT ILLNESS:   I was asked to see this patient consultation for spine pain by Dr. Sasha Purcell              The patient is a 56 y.o. female  History of cardiac valve replacement ,  1 month history of atraumatic thoracic pain and low back pain. No trauma. Reports constant severe pain thoracic area still present not improved with medications noted below. This now extended and her low back. She has no radiating pain or weakness. No fevers or chills. No known history of osteoporosis no history of cancer. She does report feeling warm and subjective fevers and night but no night sweats no other recent illnesses     Negative chest x-ray lab tests including d-dimer    Past Medical History:   Diagnosis Date    Anxiety     Anxiety     Aortic valve defect 2008    congenital,Ao replaced,Coronary angiogram= Normal    Depression     HTN (hypertension)     Hyperlipidemia     Hyperlipidemia     Hypertension     LONG TERM ANTICOAGULENT USE     Palpitations     Pneumonia     Psoriasis           Pain Assessment  Location of Pain: Back  Severity of Pain: 8  Quality of Pain: Aching  Duration of Pain: Persistent  Frequency of Pain: Constant  Aggravating Factors: Standing, Walking, Other (Comment)  Limiting Behavior: Yes  Relieving Factors: Rest  Result of Injury: No  Work-Related Injury: No  Are there other pain locations you wish to document?: No    The pain assessment was noted & reviewed in the medical record today.      Current/Past Treatment:   · Physical Therapy:  heat  · Chiropractic:     · Injection:     · Medications:    Pred taper ×2 , Aleve Tylenol on the lidocaine gel  · Surgery/Consult:    Work Status/Functionality:      Past Medical History: Medical history form was reviewed today & scanned into the media tab  Past Medical History:   Diagnosis Date    Anxiety     Anxiety     Aortic valve defect 2008    congenital,Ao replaced,Coronary angiogram= Normal    Depression     HTN (hypertension)     Hyperlipidemia     Hyperlipidemia     Hypertension     LONG TERM ANTICOAGULENT USE     Palpitations     Pneumonia     Psoriasis       Past Surgical History:     Past Surgical History:   Procedure Laterality Date    AORTIC VALVE REPLACEMENT       4 years ago    AORTIC VALVE REPLACEMENT      CARDIAC VALVE REPLACEMENT  9/25/2008    CERVICAL DISCECTOMY  2005    CERVICAL FUSION  2006    COLONOSCOPY  5/8/14    Tubular Adenoma. Internal Hemorrhoids. Redo 5 years.  HERNIA REPAIR      HYSTERECTOMY  1998    BSO    HYSTERECTOMY, TOTAL ABDOMINAL      KNEE ARTHROSCOPY  9/25/12    right partial medial menisectomy    LAPAROSCOPY  1997 & 1994    endometriasis    NASAL SEPTUM SURGERY  2005    NASAL SEPTUM SURGERY      VASCULAR SURGERY      descending artery repair     Current Medications:     Current Outpatient Prescriptions:     busPIRone (BUSPAR) 10 MG tablet, TAKE ONE-HALF TABLET BY MOUTH TWICE A DAY FOR ANXIETY, Disp: 45 tablet, Rfl: 5    traMADol (ULTRAM) 50 MG tablet, Take 1 tablet by mouth every 8 hours as needed for Pain for up to 7 days .  Take lowest dose possible to manage pain., Disp: 21 tablet, Rfl: 0    Lidocaine 4 % GEL, Apply 2 g topically 4 times daily, Disp: 120 g, Rfl: 0    predniSONE (DELTASONE) 10 MG tablet, 4 pills a day x 4 days then 3 pills a day x 4 days  then 2 pills a day x 4 days then 1 pill a day x 4 days then stop, Disp: 40 tablet, Rfl: 0    warfarin (COUMADIN) 2 MG tablet, TAKE 1 TABLET BY MOUTH DAILY, Disp: 90 tablet, Rfl: 0    metoprolol tartrate (LOPRESSOR) 25 MG tablet, TAKE 1/2 TABLET BY MOUTH TWICE A DAY FOR HIGH BLOOD PRESSURE, Disp: 60 tablet, Rfl: 9    fluticasone (FLONASE) 50 MCG/ACT nasal spray, 1 spray each nostril daily, Disp: 3 Bottle, smokeless tobacco. She reports that she drinks alcohol. She reports that she does not use drugs. Family History:   Family History   Problem Relation Age of Onset    Stroke Mother     Pacemaker Mother     Stroke Father     Heart Disease Father 61     MI    High Blood Pressure Father        REVIEW OF SYSTEMS: Full ROS noted & scanned   CONSTITUTIONAL: Denies unexplained weight loss, fevers, chills or fatigue  NEUROLOGICAL: Denies unsteady gait or progressive weakness  MUSCULOSKELETAL: Denies joint swelling or redness  PSYCHOLOGICAL: Denies anxiety, depression   SKIN: Denies skin changes, delayed healing, rash, itching   HEMATOLOGIC: Denies easy bleeding or bruising  ENDOCRINE: Denies excessive thirst, urination, heat/cold  RESPIRATORY: Denies current dyspnea, cough  GI: Denies nausea, vomiting, diarrhea   : Denies bowel or bladder issues       PHYSICAL EXAM:    Vitals: Blood pressure 113/78, pulse 82, height 5' 2.99\" (1.6 m), weight 145 lb 4.5 oz (65.9 kg), not currently breastfeeding. GENERAL EXAM:  · General Apparence: Patient is adequately groomed with no evidence of malnutrition. · Orientation: The patient is oriented to time, place and person. · Mood & Affect:The patient's mood and affect are appropriate   · Vascular: Examination reveals no swelling tenderness in upper or lower extremities. Good capillary refill  · Lymphatic: The lymphatic examination bilaterally reveals all areas to be without enlargement or induration  · Sensation: Sensation is intact without deficit  · Coordination/Balance: Good coordination     CERVICAL EXAMINATION:  · Inspection: Local inspection shows no step-off or bruising. Cervical alignment is normal.     · Palpation: No evidence of tenderness at the midline, and trapezius. Paraspinal tenderness is present. There is no step-off or paraspinal spasm.    · Range of Motion:  Normal gait  · Strength: 5/5 bilateral upper extremities   · Special Tests:    ·   Spurling's, L'Hermitte's & Spears's negative bilaterally. ·   Irizarry and Impingement tests are negative bilaterally. ·  Cubital and Carpal tunnel Tinel's negative bilaterally. · Skin:There are no rashes, ulcerations or lesions in right & left upper extremities. · Reflexes: Bilaterally triceps, biceps and brachioradialis are trace. Clonus absent bilaterally at the feet. · Additional Examinations:       · RIGHT UPPER EXTREMITY:  Inspection/examination of the right upper extremity does not show any tenderness, deformity or injury. Range of motion is full. There is no gross instability. There are no rashes, ulcerations or lesions. Strength and tone are normal.  · LEFT UPPER EXTREMITY: Inspection/examination of the left upper extremity does not show any tenderness, deformity or injury. Range of motion is full. There is no gross instability. There are no rashes, ulcerations or lesions. Strength and tone are normal.    LUMBAR/SACRAL EXAMINATION:  · Inspection:  Mild thoracic kyphosis  · Palpation:   Mild tenderness midline and paraspinals from around T6 to T10  · Range of Motion:   Moderate loss of flexion extension  · Strength:   Strength testing is 5/5 in all muscle groups tested. · Special Tests:   Straight leg raise and crossed SLR negative. Leg length and pelvis level.  0 out of 5 Landon's signs. · Skin: There are no rashes, ulcerations or lesions. · Reflexes: Reflexes are symmetrically 2+ at the patellar and ankle tendons. Clonus absent bilaterally at the feet. · Gait & station:  Normal gait  · Additional Examinations:   · RIGHT LOWER EXTREMITY: Inspection/examination of the right lower extremity does not show any tenderness, deformity or injury. Range of motion is full. There is no gross instability. There are no rashes, ulcerations or lesions.  Strength and tone are normal.  ·   · LEFT LOWER EXTREMITY:  Inspection/examination of the left lower extremity does not show any tenderness, deformity or injury. Range of motion is full. There is no gross instability. There are no rashes, ulcerations or lesions.   Strength and tone are normal.    Diagnostic Testing:       recent chest x-ray shows no acute process     Recent thoracic x-rays reviewed showing moderate mid thoracic endplate spurring     January 31 2018 2 views lumbar spine AP and lateral    1/23/2018  6:36 PM - Joaquim, Swoh Incoming Results [facility]     Component Results     Component Value Ref Range & Units Status Collected Lab   WBC 11.4   4.0 - 11.0 K/uL Final 01/23/2018  3:35 PM [address] Lab   RBC 5.07  4.00 - 5.20 M/uL Final 01/23/2018  3:35 PM [address] Lab   Hemoglobin 13.6  12.0 - 16.0 g/dL Final 01/23/2018  3:35 PM [address] Lab   Hematocrit 41.5  36.0 - 48.0 % Final 01/23/2018  3:35 PM [facility] Lab   MCV 82.0  80.0 - 100.0 fL Final 01/23/2018  3:35 PM [facility] Lab   MCH 26.9  26.0 - 34.0 pg Final 01/23/2018  3:35 PM [address] Lab   MCHC 32.8  31.0 - 36.0 g/dL Final 01/23/2018  3:35 PM [address] Lab   RDW 17.2   12.4 - 15.4 % Final 01/23/2018  3:35 PM [address] Lab   Platelets 860  601 - 450 K/uL Final 01/23/2018  3:35 PM [address] Lab   MPV 10.7   5.0 - 10.5 fL Final 01/23/2018  3:35 PM [address] Lab         Impression:     1 month  Thoracic and lumbar pain   radiographic spondylosis thoracic and lumbar spine      Plan:      CT scan thoracic and lumbar spine rule out occult  Fracture   If normal then treat this as aggravation spondylosis w/ PT   she has some subjective fevers in the evening but normal sed rate , she'll take her temperature in the evenings     I will support her family medical leave act  One-week a month over the next 4 months    BRITT Robin

## 2018-02-01 ENCOUNTER — TELEPHONE (OUTPATIENT)
Dept: ORTHOPEDIC SURGERY | Age: 58
End: 2018-02-01

## 2018-02-02 ENCOUNTER — ANTI-COAG VISIT (OUTPATIENT)
Dept: CARDIOLOGY CLINIC | Age: 58
End: 2018-02-02

## 2018-02-02 ENCOUNTER — TELEPHONE (OUTPATIENT)
Dept: CARDIOLOGY CLINIC | Age: 58
End: 2018-02-02

## 2018-02-02 ENCOUNTER — HOSPITAL ENCOUNTER (OUTPATIENT)
Dept: CT IMAGING | Age: 58
Discharge: HOME | End: 2018-02-02
Attending: PHYSICAL MEDICINE & REHABILITATION | Admitting: PHYSICAL MEDICINE & REHABILITATION

## 2018-02-02 DIAGNOSIS — M47.816 LUMBAR SPONDYLOSIS: ICD-10-CM

## 2018-02-02 DIAGNOSIS — M54.50 ACUTE LOW BACK PAIN WITHOUT SCIATICA, UNSPECIFIED BACK PAIN LATERALITY: ICD-10-CM

## 2018-02-02 DIAGNOSIS — M47.814 SPONDYLOSIS OF THORACIC REGION WITHOUT MYELOPATHY OR RADICULOPATHY: ICD-10-CM

## 2018-02-02 LAB — INR BLD: 3.5

## 2018-02-02 NOTE — PROGRESS NOTES
02/02/18: INR is 3.5. Per protocol, no changes. Continue 2 mg daily. Recheck 2 week(s).  Provider notified. ~ Ger Krishnamurthy RN

## 2018-02-07 ENCOUNTER — OFFICE VISIT (OUTPATIENT)
Dept: ORTHOPEDIC SURGERY | Age: 58
End: 2018-02-07

## 2018-02-07 VITALS
SYSTOLIC BLOOD PRESSURE: 119 MMHG | DIASTOLIC BLOOD PRESSURE: 81 MMHG | HEIGHT: 63 IN | HEART RATE: 77 BPM | BODY MASS INDEX: 25.74 KG/M2 | WEIGHT: 145.28 LBS

## 2018-02-07 DIAGNOSIS — M47.25 OSTEOARTHRITIS OF SPINE WITH RADICULOPATHY, THORACOLUMBAR REGION: ICD-10-CM

## 2018-02-07 DIAGNOSIS — S29.019S STRAIN OF THORACIC REGION, SEQUELA: Primary | ICD-10-CM

## 2018-02-07 DIAGNOSIS — S39.012D STRAIN OF LUMBAR REGION, SUBSEQUENT ENCOUNTER: ICD-10-CM

## 2018-02-07 PROCEDURE — 99213 OFFICE O/P EST LOW 20 MIN: CPT | Performed by: PHYSICAL MEDICINE & REHABILITATION

## 2018-02-07 RX ORDER — POTASSIUM CHLORIDE 750 MG/1
TABLET, EXTENDED RELEASE ORAL
Qty: 180 TABLET | Refills: 0 | Status: SHIPPED | OUTPATIENT
Start: 2018-02-07 | End: 2018-05-04 | Stop reason: SDUPTHER

## 2018-02-07 RX ORDER — MELOXICAM 15 MG/1
15 TABLET ORAL DAILY
Qty: 30 TABLET | Refills: 1 | Status: SHIPPED | OUTPATIENT
Start: 2018-02-07 | End: 2019-01-01

## 2018-02-07 NOTE — PROGRESS NOTES
upper or lower extremities. · Lymphatic: The lymphatic examination bilaterally reveals all areas to be without enlargement or induration  · Sensation: Sensation is intact without deficit  · Coordination/Balance: Good coordination   ·   LUMBAR/SACRAL EXAMINATION:  · Inspection: Local inspection shows no step-off or bruising. Lumbar alignment is normal.  Sagittal and Coronal balance is neutral.      · Palpation:   No evidence of tenderness at the midline. No tenderness bilaterally at the paraspinal or trochanters. There is no step-off or paraspinal spasm. No focal percussive tenderness at thoracic spine  · Range of Motion:   50% loss of flexion extension  · Strength:   Strength testing is 5/5 in all muscle groups tested. · Special Tests:   Straight leg raise and crossed SLR negative. Leg length and pelvis level.  0 out of 5 Landon's signs. · Skin: There are no rashes, ulcerations or lesions. · Reflexes: Reflexes are symmetrically trace at the patellar and ankle tendons. Clonus absent bilaterally at the feet. · Gait & station: Normal gait  · Additional Examinations:   ·   · RIGHT LOWER EXTREMITY: Inspection/examination of the right lower extremity does not show any tenderness, deformity or injury. Range of motion is full. There is no gross instability. There are no rashes, ulcerations or lesions. Strength and tone are normal.  · LEFT LOWER EXTREMITY:  Inspection/examination of the left lower extremity does not show any tenderness, deformity or injury. Range of motion is full. There is no gross instability. There are no rashes, ulcerations or lesions. Strength and tone are normal.    Diagnostic Testing:     February 2, 2018  EXAMINATION:   CT OF THE THORACIC SPINE WITHOUT CONTRAST  2/2/2018 3:10 pm:       TECHNIQUE:   CT of the thoracic spine was performed without the administration of   intravenous contrast. Multiplanar reformatted images are provided for review.    Dose modulation, iterative

## 2018-02-08 RX ORDER — GABAPENTIN 300 MG/1
CAPSULE ORAL
Qty: 180 CAPSULE | Refills: 3 | Status: SHIPPED | OUTPATIENT
Start: 2018-02-08 | End: 2019-03-13 | Stop reason: SDUPTHER

## 2018-02-09 ENCOUNTER — HOSPITAL ENCOUNTER (OUTPATIENT)
Dept: PHYSICAL THERAPY | Age: 58
Discharge: HOME | End: 2018-02-28
Admitting: PHYSICAL MEDICINE & REHABILITATION

## 2018-02-09 NOTE — FLOWSHEET NOTE
Supine during HEP instruction    Charges:  Timed Code Treatment Minutes: 25   Total Treatment Minutes: 55     [] EVAL (LOW) 03756 (typically 20 minutes face-to-face)  [x] EVAL (MOD) 60123 (typically 30 minutes face-to-face)  [] EVAL (HIGH) 40269 (typically 45 minutes face-to-face)  [] RE-EVAL     [x] AT(62778) x  2   [] IONTO  [] NMR (21557) x      [] VASO  [] Manual (47387) x       [] Other:  [] TA x       [] Mech Traction (15390)  [] ES(attended) (73296)      [] ES (un) (51784):     Goals: Patient stated goal: Find a way to ease pain and work with minimal pain    Therapist goals for Patient:   Short Term Goals: To be achieved in: 2 weeks  1. Independent in HEP and progression per patient tolerance, in order to prevent re-injury. 2. Patient will have a decrease in pain to facilitate improvement in movement, function, and ADLs as indicated by Functional Deficits. Long Term Goals: To be achieved in: 6 weeks  1. Disability index score of 26% or less for the Modified Oswestry  to assist with reaching prior level of function. 2. Patient will demonstrate increased AROM to WNL, good LS mobility, good hip ROM to allow for proper joint functioning as indicated by patients Functional Deficits. 3. Patient will demonstrate an increase in Strength to good proximal hip and core activation to allow for proper functional mobility as indicated by patients Functional Deficits. 4. Patient will return to sleeping through the night, Standing for up to 3 hours at a time during the work day, painfree bed mobility and able to lift correctly and painfree for work. functional activities without increased symptoms or restriction. 5. Find a way to ease pain and work with minimal pain. (patient specific functional goal  Progression Towards Functional goals:  [] Patient is progressing as expected towards functional goals listed. [] Progression is slowed due to complexities listed.   [] Progression has been slowed due to

## 2018-02-09 NOTE — PLAN OF CARE
Reflexes/Sensation: - Hoffmans Test [x]Dermatomes/Myotomes intact    []UE Reflexes     []Normal []Hypo      []Hyper   []LE Reflexes     []Normal []Hypo      []Hyper   []Babinski/Clonus/Hoffmans:    []Other:    Joint mobility: Thoracic   []Normal    [x]Hypo   []Hyper    Palpation: Thoracolumbar paraspinals R >L, QL on R    Functional Mobility/Transfers: Poor bed mobility technique    Posture: Rounded thoracic kyphosis, In WB less weight noted on R LE    Bandages/Dressings/Incisions: None    Gait: (include devices/WB status) Reduced hip flexion and knee extension during Gait cycle    Orthopedic Special Tests: -SLR B, -Slump Test B, Walking Functional S1/S2 weakness. [x] Patient history, allergies, meds reviewed. Medical chart reviewed. See intake form. Review Of Systems (ROS):  [x]Performed Review of systems (Integumentary, CardioPulmonary, Neurological) by intake and observation. Intake form has been scanned into medical record. Patient has been instructed to contact their primary care physician regarding ROS issues if not already being addressed at this time.       Co-morbidities/Complexities (which will affect course of rehabilitation):   []None           Arthritic conditions   []Rheumatoid arthritis (M05.9)  [x]Osteoarthritis (M19.91)   Cardiovascular conditions   [x]Hypertension (I10)  []Hyperlipidemia (E78.5)  []Angina pectoris (I20)  []Atherosclerosis (I70)   Musculoskeletal conditions   []Disc pathology   []Congenital spine pathologies   [x]Prior surgical intervention  []Osteoporosis (M81.8)  []Osteopenia (M85.8)   Endocrine conditions   []Hypothyroid (E03.9)  []Hyperthyroid Gastrointestinal conditions   []Constipation (P56.24)   Metabolic conditions   []Morbid obesity (E66.01)  []Diabetes type 1(E10.65) or 2 (E11.65)   []Neuropathy (G60.9)     Pulmonary conditions   [x]Asthma (J45)  []Coughing   []COPD (J44.9)   Psychological Disorders  []Anxiety (F41.9)  [x]Depression (F32.9) as indicated by Functional Deficits. Long Term Goals: To be achieved in: 6 weeks  1. Disability index score of 26% or less for the Modified Oswestry  to assist with reaching prior level of function. 2. Patient will demonstrate increased AROM to WNL, good LS mobility, good hip ROM to allow for proper joint functioning as indicated by patients Functional Deficits. 3. Patient will demonstrate an increase in Strength to good proximal hip and core activation to allow for proper functional mobility as indicated by patients Functional Deficits. 4. Patient will return to sleeping through the night, Standing for up to 3 hours at a time during the work day, painfree bed mobility and able to lift correctly and painfree for work. functional activities without increased symptoms or restriction.    5. Find a way to ease pain and work with minimal pain. (patient specific functional goal)         Electronically signed by:  Jeremy Yoo PT

## 2018-02-13 ENCOUNTER — HOSPITAL ENCOUNTER (OUTPATIENT)
Dept: PHYSICAL THERAPY | Age: 58
Discharge: HOME | End: 2018-02-14
Admitting: PHYSICAL MEDICINE & REHABILITATION

## 2018-02-13 RX ORDER — SIMVASTATIN 40 MG
TABLET ORAL
Qty: 90 TABLET | Refills: 3 | Status: SHIPPED | OUTPATIENT
Start: 2018-02-13 | End: 2019-03-13 | Stop reason: SDUPTHER

## 2018-02-13 NOTE — FLOWSHEET NOTE
[facility], [address]  Phone: [phone]  Fax [phone]    Physical Therapy Daily Treatment Note  Date:  2018    Patient Name:  Donald Hall    :  1960  MRN: [id number]  Restrictions/Precautions:    Medical/Treatment Diagnosis Information:  Diagnosis: M47.25 (ICD-10-CM) - Osteoarthritis of spine with radiculopathy, thoracolumbar region  Treatment Diagnosis: Lumbar Pain M54.5, Thoracic Pain M91.8  Insurance/Certification information:  PT Insurance Information: [employer]  Physician Information:  Referring Practitioner: Dr Portia Torres of care signed (Y/N): Y    Date of Patient follow up with Physician:   G-Code (if applicable):      Date G-Code Applied:    PT G-Codes  Functional Assessment Tool Used: Modified Oswestry  Score: 52%  Functional Limitation: Changing and maintaining body position  Changing and Maintaining Body Position Current Status (): At least 40 percent but less than 60 percent impaired, limited or restricted  Changing and Maintaining Body Position Goal Status (): At least 20 percent but less than 40 percent impaired, limited or restricted    Progress Note: []  Yes  [x]  No  Next due by: Visit #10      Latex Allergy:  [x]NO      []YES  Preferred Language for Healthcare:   [x]English       []other:     Visit # Insurance Allowable Requires auth   2 45 remain (5 used)    [x]no        []yes:     Pain level:  2-8/10     SUBJECTIVE:   \"Good and bad days. Today started off good but got very bad after 4 hours. Sat with a heating pad and took a pain pill. \"    OBJECTIVE:   Observation: Pt is significantly tight in L lower lumbar paraspinals and R lower thoracic paraspinals.   Test measurements:      RESTRICTIONS/PRECAUTIONS: Aortic valve Replacement/Cardiac Meds, R knee PMM , OA Spine, Cervical Fusion, HTN and Depression  Exercises/Interventions:     Therapeutic Ex sets/reps comments   LTR 10x3\" with TA HEP   SKC R/L Ea 30 sec x 3 HEP   HEP only this visit   HEP   No Money seated EOB with ball ADD at knees 15x5\" HEP   HL ADD set with neutral spine 10x5\" +HEP 2/13   HL B clams with neutral spine 10x5\" +HEP 2/13                                                Manual Intervention      STM lumbar and thoracic paraspinals 6'    Sacral base mobs and anterior tilt sacral mob, GII-III lumbar PA and rotational mobs 10'    Piriformis and GM release R 6'    Prone quad stretch R 5'                        NMR re-education      Seated MET to correct for L trunk rotation (increase R trunk rotation) 4 positions 5x5\" each                          Therapeutic Exercise and NMR EXR  [x] (58478) Provided verbal/tactile cueing for activities related to strengthening, flexibility, endurance, ROM  for improvements in proximal hip and core control with self care, mobility, lifting and ambulation. [x] (81388) Provided verbal/tactile cueing for activities related to improving balance, coordination, kinesthetic sense, posture, motor skill, proprioception  to assist with core control in self care, mobility, lifting, and ambulation.      Therapeutic Activities:    [] (37923 or 36949) Provided verbal/tactile cueing for activities related to improving balance, coordination, kinesthetic sense, posture, motor skill, proprioception and motor activation to allow for proper function  with self care and ADLs  [] (43469) Provided training and instruction to the patient for proper core and proximal hip recruitment and positioning with ambulation re-education     Home Exercise Program:    [x] (98587) Reviewed/Progressed HEP activities related to strengthening, flexibility, endurance, ROM of core, proximal hip and LE for functional self-care, mobility, lifting and ambulation   [] (83179) Reviewed/Progressed HEP activities related to improving balance, coordination, kinesthetic sense, posture, motor skill,

## 2018-02-14 ENCOUNTER — OFFICE VISIT (OUTPATIENT)
Dept: CARDIOLOGY CLINIC | Age: 58
End: 2018-02-14

## 2018-02-14 VITALS
HEIGHT: 63 IN | WEIGHT: 145 LBS | SYSTOLIC BLOOD PRESSURE: 110 MMHG | OXYGEN SATURATION: 95 % | DIASTOLIC BLOOD PRESSURE: 74 MMHG | BODY MASS INDEX: 25.69 KG/M2 | HEART RATE: 72 BPM

## 2018-02-14 DIAGNOSIS — E78.5 HYPERLIPIDEMIA, UNSPECIFIED HYPERLIPIDEMIA TYPE: ICD-10-CM

## 2018-02-14 DIAGNOSIS — I10 ESSENTIAL HYPERTENSION: Primary | ICD-10-CM

## 2018-02-14 PROCEDURE — 99214 OFFICE O/P EST MOD 30 MIN: CPT | Performed by: INTERNAL MEDICINE

## 2018-02-14 NOTE — PROGRESS NOTES
[facility]   Cardiac Consultation    Referring Provider:  Natalia Diego MD     Chief Complaint   Patient presents with    Cardiac Valve Problem    Hypertension    Hyperlipidemia        History of Present Illness:  F/U of  HTN, HLD, AVR in 2008, [facility] [facility] Valve on coumadin, angiogram at that time was normal coronaries. Today she reports she is doing well overall. She occasionally checks her BP at home at it is high at times. She continues to smoke. She is taking all of prescribed mediations including coumadin. She denies CP, SOB, dizziness and syncope. She has been active at work, but has not been exercising. Past Medical History:   has a past medical history of Anxiety; Anxiety; Aortic valve defect; Depression; HTN (hypertension); Hyperlipidemia; Hyperlipidemia; Hypertension; LONG TERM ANTICOAGULENT USE; Palpitations; Pneumonia; and Psoriasis. Surgical History:   has a past surgical history that includes laparoscopy (1997 & 1994); Cardiac valve replacement (9/25/2008); Hysterectomy (1998); Nasal septum surgery (2005); cervical fusion (2006); Cervical discectomy (2005); Knee arthroscopy (9/25/12); Aortic valve replacement; vascular surgery; Hysterectomy, total abdominal; Aortic valve replacement; Nasal septum surgery; Colonoscopy (5/8/14); and hernia repair. Social History:   reports that she has been smoking. She has a 20.00 pack-year smoking history. She has never used smokeless tobacco. She reports that she drinks alcohol. She reports that she does not use drugs. Family History:  family history includes Heart Disease (age of onset: 61) in her father; High Blood Pressure in her father; Pacemaker in her mother; Stroke in her father and mother. Home Medications:  Prior to Admission medications    Medication Sig Start Date End Date Taking?  Authorizing Provider   simvastatin (ZOCOR) 40 MG tablet TAKE 1 TABLET BY MOUTH EVERY DAY AT 9PM FOR HIGH CHOLESTEROL 2/13/18  Yes Maricarmen Levin MD gabapentin (NEURONTIN) 300 MG capsule TAKE ONE CAPSULE BY MOUTH TWICE A DAY FOR NEUROPATHY 2/8/18 5/9/18 Yes Barry Padron MD   KLOR-CON M10 10 MEQ extended release tablet TAKE 1 TABLET BY MOUTH DAILY 2/7/18  Yes Charu Ramos MD   meloxicam (MOBIC) 15 MG tablet Take 1 tablet by mouth daily 2/7/18  Yes BRITT Duke MD   busPIRone (BUSPAR) 10 MG tablet TAKE ONE-HALF TABLET BY MOUTH TWICE A DAY FOR ANXIETY 1/30/18  Yes Barry Padron MD   metoprolol tartrate (LOPRESSOR) 25 MG tablet TAKE 1/2 TABLET BY MOUTH TWICE A DAY FOR HIGH BLOOD PRESSURE 12/10/17  Yes Barry Padron MD   PROVENTIL  (90 Base) MCG/ACT inhaler INHALE 2 PUFFS INTO THE LUNGS EVERY 6 HOURS AS NEEDED FOR WHEEZING 12/1/17  Yes Barry Padron MD   hydrochlorothiazide (HYDRODIURIL) 25 MG tablet TAKE 1 TABLET BY MOUTH DAILY 9/25/17  Yes Charu Ramos MD   warfarin (COUMADIN) 2 MG tablet TAKE 1 TABLET BY MOUTH DAILY 2/23/17  Yes Charu Ramos MD   aspirin 325 MG EC tablet TAKE 1 TABLET BY MOUTH DAILY 2/23/17  Yes Charu Ramos MD   MINIVELLE 0.05 MG/24HR  1/16/16  Yes Krzysztof Casas MD   fluticasone (FLONASE) 50 MCG/ACT nasal spray 2 sprays by Nasal route daily. 3/17/15  Yes Barry Padron MD   meclizine (ANTIVERT) 25 MG tablet Take 1 tablet by mouth 3 times daily as needed for Dizziness. 2/9/15  Yes Xochilt Coburn MD   albuterol (PROVENTIL HFA;VENTOLIN HFA) 108 (90 BASE) MCG/ACT inhaler Inhale 2 puffs into the lungs every 6 hours as needed for Wheezing. 4/17/14  Yes Laurel Eagle CNP        Allergies:  Review of patient's allergies indicates no known allergies. Review of Systems:   · Constitutional: there has been no unanticipated weight loss. There's been no change in energy level, sleep pattern, or activity level. · Eyes: No visual changes or diplopia. No scleral icterus. · ENT: No Headaches, hearing loss or vertigo. No mouth sores or sore throat.   · Cardiovascular: Reviewed in HPI  · Respiratory: No ejection  fraction of 55%. No regional wall motion abnormalities are seen. Elevated left ventricular diastolic filling pressure ( Septal E/e' 17). Mild thickening of mitral leaflets. Normally functioning mechanical prosthetic valve in aortic position. Doppler  parameters are normal for the valve. Holter 3/2015  PVC, PAC    Assessment:     1. H/O aortic valve replacement, mechanical, 2008. (prior bicuspid w/ AS). Stable. Echo reviewed. 2. HTN (hypertension) - controlled, rare episodes elev   3. Hyperlipidemia - controlled. Discussed diet and exercise   4. Anticoagulant long-term use    5. Dizziness - doubt cardiac. No recent   6. Aortic valve disease    7. Palpitations - mild, stable   8. Smoking - cessation discussed    Plan:  Continue current medications   Smoking cessation  Continue to monitor B/P routinely-can take an extra metoprolol If needed   Repeat echo next year   F/u with me in 1 year. Denver Hartigan Jonathan Badger, M.D., [facility]

## 2018-02-14 NOTE — COMMUNICATION BODY
[facility]   Cardiac Consultation    Referring Provider:  Dylon Dent MD     Chief Complaint   Patient presents with    Cardiac Valve Problem    Hypertension    Hyperlipidemia        History of Present Illness:  F/U of  HTN, HLD, AVR in 2008, [facility] Valve on coumadin, angiogram at that time was normal coronaries. Today she reports she is doing well overall. She occasionally checks her BP at home at it is high at times. She continues to smoke. She is taking all of prescribed mediations including coumadin. She denies CP, SOB, dizziness and syncope. She has been active at work, but has not been exercising. Past Medical History:   has a past medical history of Anxiety; Anxiety; Aortic valve defect; Depression; HTN (hypertension); Hyperlipidemia; Hyperlipidemia; Hypertension; LONG TERM ANTICOAGULENT USE; Palpitations; Pneumonia; and Psoriasis. Surgical History:   has a past surgical history that includes laparoscopy (1997 & 1994); Cardiac valve replacement (9/25/2008); Hysterectomy (1998); Nasal septum surgery (2005); cervical fusion (2006); Cervical discectomy (2005); Knee arthroscopy (9/25/12); Aortic valve replacement; vascular surgery; Hysterectomy, total abdominal; Aortic valve replacement; Nasal septum surgery; Colonoscopy (5/8/14); and hernia repair. Social History:   reports that she has been smoking. She has a 20.00 pack-year smoking history. She has never used smokeless tobacco. She reports that she drinks alcohol. She reports that she does not use drugs. Family History:  family history includes Heart Disease (age of onset: 61) in her father; High Blood Pressure in her father; Pacemaker in her mother; Stroke in her father and mother. Home Medications:  Prior to Admission medications    Medication Sig Start Date End Date Taking?  Authorizing Provider   simvastatin (ZOCOR) 40 MG tablet TAKE 1 TABLET BY MOUTH EVERY DAY AT 9PM FOR HIGH CHOLESTEROL 2/13/18  Yes Torito Rubi MD gabapentin (NEURONTIN) 300 MG capsule TAKE ONE CAPSULE BY MOUTH TWICE A DAY FOR NEUROPATHY 2/8/18 5/9/18 Yes Apolinar Padron MD   KLOR-CON M10 10 MEQ extended release tablet TAKE 1 TABLET BY MOUTH DAILY 2/7/18  Yes Calros Madison MD   meloxicam (MOBIC) 15 MG tablet Take 1 tablet by mouth daily 2/7/18  Yes BRITT Barajas MD   busPIRone (BUSPAR) 10 MG tablet TAKE ONE-HALF TABLET BY MOUTH TWICE A DAY FOR ANXIETY 1/30/18  Yes Apolinar Padron MD   metoprolol tartrate (LOPRESSOR) 25 MG tablet TAKE 1/2 TABLET BY MOUTH TWICE A DAY FOR HIGH BLOOD PRESSURE 12/10/17  Yes Apolinar Padron MD   PROVENTIL  (90 Base) MCG/ACT inhaler INHALE 2 PUFFS INTO THE LUNGS EVERY 6 HOURS AS NEEDED FOR WHEEZING 12/1/17  Yes Apolinar Padron MD   hydrochlorothiazide (HYDRODIURIL) 25 MG tablet TAKE 1 TABLET BY MOUTH DAILY 9/25/17  Yes Carlos Madison MD   warfarin (COUMADIN) 2 MG tablet TAKE 1 TABLET BY MOUTH DAILY 2/23/17  Yes Carlos Madison MD   aspirin 325 MG EC tablet TAKE 1 TABLET BY MOUTH DAILY 2/23/17  Yes Carlos Madison MD   MINIVELLE 0.05 MG/24HR  1/16/16  Yes Krzysztof Casas MD   fluticasone (FLONASE) 50 MCG/ACT nasal spray 2 sprays by Nasal route daily. 3/17/15  Yes Apolinar Padron MD   meclizine (ANTIVERT) 25 MG tablet Take 1 tablet by mouth 3 times daily as needed for Dizziness. 2/9/15  Yes Steven León MD   albuterol (PROVENTIL HFA;VENTOLIN HFA) 108 (90 BASE) MCG/ACT inhaler Inhale 2 puffs into the lungs every 6 hours as needed for Wheezing. 4/17/14  Yes Addie Guzman CNP        Allergies:  Review of patient's allergies indicates no known allergies. Review of Systems:   · Constitutional: there has been no unanticipated weight loss. There's been no change in energy level, sleep pattern, or activity level. · Eyes: No visual changes or diplopia. No scleral icterus. · ENT: No Headaches, hearing loss or vertigo. No mouth sores or sore throat.   · Cardiovascular: Reviewed in HPI  · Respiratory: No

## 2018-02-14 NOTE — LETTER
family history includes Heart Disease (age of onset: 61) in her father; High Blood Pressure in her father; Pacemaker in her mother; Stroke in her father and mother. Home Medications:  Prior to Admission medications    Medication Sig Start Date End Date Taking? Authorizing Provider   simvastatin (ZOCOR) 40 MG tablet TAKE 1 TABLET BY MOUTH EVERY DAY AT 9PM FOR HIGH CHOLESTEROL 2/13/18  Yes Mary Jo Padron MD   gabapentin (NEURONTIN) 300 MG capsule TAKE ONE CAPSULE BY MOUTH TWICE A DAY FOR NEUROPATHY 2/8/18 5/9/18 Yes Mary Jo Padron MD   KLOR-CON M10 10 MEQ extended release tablet TAKE 1 TABLET BY MOUTH DAILY 2/7/18  Yes Amrik Angelo MD   meloxicam (MOBIC) 15 MG tablet Take 1 tablet by mouth daily 2/7/18  Yes BRITT Kumar MD   busPIRone (BUSPAR) 10 MG tablet TAKE ONE-HALF TABLET BY MOUTH TWICE A DAY FOR ANXIETY 1/30/18  Yes Mary Jo Padron MD   metoprolol tartrate (LOPRESSOR) 25 MG tablet TAKE 1/2 TABLET BY MOUTH TWICE A DAY FOR HIGH BLOOD PRESSURE 12/10/17  Yes Mary Jo Padron MD   PROVENTIL  (90 Base) MCG/ACT inhaler INHALE 2 PUFFS INTO THE LUNGS EVERY 6 HOURS AS NEEDED FOR WHEEZING 12/1/17  Yes Mary Jo Padron MD   hydrochlorothiazide (HYDRODIURIL) 25 MG tablet TAKE 1 TABLET BY MOUTH DAILY 9/25/17  Yes Amrik Angelo MD   warfarin (COUMADIN) 2 MG tablet TAKE 1 TABLET BY MOUTH DAILY 2/23/17  Yes Amrik Angelo MD   aspirin 325 MG EC tablet TAKE 1 TABLET BY MOUTH DAILY 2/23/17  Yes Amrik Angelo MD   MINIVELLE 0.05 MG/24HR  1/16/16  Yes Historical Provider, MD   fluticasone (FLONASE) 50 MCG/ACT nasal spray 2 sprays by Nasal route daily. 3/17/15  Yes Mary Jo Padron MD   meclizine (ANTIVERT) 25 MG tablet Take 1 tablet by mouth 3 times daily as needed for Dizziness. 2/9/15  Yes Giovana Bennett MD   albuterol (PROVENTIL HFA;VENTOLIN HFA) 108 (90 BASE) MCG/ACT inhaler Inhale 2 puffs into the lungs every 6 hours as needed for Wheezing.  4/17/14  Yes Jessica Alvarenga CNP        Allergies:

## 2018-02-16 ENCOUNTER — HOSPITAL ENCOUNTER (OUTPATIENT)
Dept: PHYSICAL THERAPY | Age: 58
Discharge: HOME | End: 2018-02-17
Admitting: PHYSICAL MEDICINE & REHABILITATION

## 2018-02-16 NOTE — FLOWSHEET NOTE
Patient will demonstrate an increase in Strength to good proximal hip and core activation to allow for proper functional mobility as indicated by patients Functional Deficits. 4. Patient will return to sleeping through the night, Standing for up to 3 hours at a time during the work day, painfree bed mobility and able to lift correctly and painfree for work. functional activities without increased symptoms or restriction. 5. Find a way to ease pain and work with minimal pain. (patient specific functional goal    Progression Towards Functional goals:  [] Patient is progressing as expected towards functional goals listed. [] Progression is slowed due to complexities listed. [] Progression has been slowed due to co-morbidities. [x] Plan just implemented, too soon to assess goals progression  [] Other:     ASSESSMENT: Patient's pain diminished after Manual Therapy and prone lying. Patient had muscle fatigue during TE but no pain or radiating symptoms. Treatment/Activity Tolerance:  [x] Patient tolerated treatment well [] Patient limited by fatique  [] Patient limited by pain  [] Patient limited by other medical complications  [] Other:     Prognosis: [] Good [x] Fair  [] Poor    Patient Requires Follow-up: [x] Yes  [] No    PLAN: Begin prone lying with a pillow to control symptoms as in the clinic.; Cont to normalize R v L hip motion and increase lumbar stability therex.   [x] Continue per plan of care [] Alter current plan (see comments)  [] Plan of care initiated [] Hold pending MD visit [] Discharge    Electronically signed by: Derrick Gage PT

## 2018-02-20 ENCOUNTER — HOSPITAL ENCOUNTER (OUTPATIENT)
Dept: PHYSICAL THERAPY | Age: 58
Discharge: HOME | End: 2018-02-21
Admitting: PHYSICAL MEDICINE & REHABILITATION

## 2018-02-20 NOTE — FLOWSHEET NOTE
self-care, mobility, lifting and ambulation   [] (14286) Reviewed/Progressed HEP activities related to improving balance, coordination, kinesthetic sense, posture, motor skill, proprioception of core, proximal hip and LE for self care, mobility, lifting, and ambulation      Manual Treatments:  PROM / STM / Oscillations-Mobs:  G-I, II, III, IV (PA's, Inf., Post.)  [x] (22251) Provided manual therapy to mobilize proximal hip and LS spine soft tissue/joints for the purpose of modulating pain, promoting relaxation,  increasing ROM, reducing/eliminating soft tissue swelling/inflammation/restriction, improving soft tissue extensibility and allowing for proper ROM for normal function with self care, mobility, lifting and ambulation. Modalities:  Seated IFC with HP x15'    Charges:  Timed Code Treatment Minutes: 46   Total Treatment Minutes: 61     [] EVAL (LOW) 52363 (typically 20 minutes face-to-face)  [] EVAL (MOD) 73215 (typically 30 minutes face-to-face)  [] EVAL (HIGH) 62628 (typically 45 minutes face-to-face)  [] RE-EVAL     [x] XP(23327) x  1   [] IONTO  [] NMR (99461) x      [] VASO  [x] Manual (01699) x  2    [] Other:  [] TA x       [] Mech Traction (64527)  [] ES(attended) (61686)      [x] ES (un) (52213):     Goals: Patient stated goal: Find a way to ease pain and work with minimal pain    Therapist goals for Patient:   Short Term Goals: To be achieved in: 2 weeks  1. Independent in HEP and progression per patient tolerance, in order to prevent re-injury. 2. Patient will have a decrease in pain to facilitate improvement in movement, function, and ADLs as indicated by Functional Deficits. Long Term Goals: To be achieved in: 6 weeks  1. Disability index score of 26% or less for the Modified Oswestry  to assist with reaching prior level of function.    2. Patient will demonstrate increased AROM to WNL, good LS mobility, good hip ROM to allow for proper joint functioning as indicated by patients Functional Deficits. 3. Patient will demonstrate an increase in Strength to good proximal hip and core activation to allow for proper functional mobility as indicated by patients Functional Deficits. 4. Patient will return to sleeping through the night, Standing for up to 3 hours at a time during the work day, painfree bed mobility and able to lift correctly and painfree for work. functional activities without increased symptoms or restriction. 5. Find a way to ease pain and work with minimal pain. (patient specific functional goal    Progression Towards Functional goals:  [] Patient is progressing as expected towards functional goals listed. [] Progression is slowed due to complexities listed. [] Progression has been slowed due to co-morbidities. [x] Plan just implemented, too soon to assess goals progression  [] Other:     ASSESSMENT: Patient did not report increase in pain level during TE until PT asked patient how she was feeling after treaatment. Patient reported that sometimes she does have an increase in pain and then pain decreases about 2 hours after PT. Discussed the importance of communicating any increase in pain at the time it occurs. Treatment/Activity Tolerance:  [x] Patient tolerated treatment well [] Patient limited by fatique  [] Patient limited by pain  [] Patient limited by other medical complications  [] Other:     Prognosis: [] Good [x] Fair  [] Poor    Patient Requires Follow-up: [x] Yes  [] No    PLAN: Begin prone lying with a pillow to control symptoms as in the clinic.; Cont to normalize R v L hip motion and increase lumbar stability therex.   [x] Continue per plan of care [] Alter current plan (see comments)  [] Plan of care initiated [] Hold pending MD visit [] Discharge    Electronically signed by: Antionette Augustin PT

## 2018-02-22 ENCOUNTER — TELEPHONE (OUTPATIENT)
Dept: CARDIOLOGY CLINIC | Age: 58
End: 2018-02-22

## 2018-02-22 ENCOUNTER — ANTI-COAG VISIT (OUTPATIENT)
Dept: CARDIOLOGY CLINIC | Age: 58
End: 2018-02-22

## 2018-02-22 LAB — INR BLD: 3.5

## 2018-02-23 ENCOUNTER — HOSPITAL ENCOUNTER (OUTPATIENT)
Dept: PHYSICAL THERAPY | Age: 58
Discharge: HOME | End: 2018-02-24
Admitting: PHYSICAL MEDICINE & REHABILITATION

## 2018-02-23 NOTE — FLOWSHEET NOTE
Home Exercise Program:    [x] (80609) Reviewed/Progressed HEP activities related to strengthening, flexibility, endurance, ROM of core, proximal hip and LE for functional self-care, mobility, lifting and ambulation   [] (23424) Reviewed/Progressed HEP activities related to improving balance, coordination, kinesthetic sense, posture, motor skill, proprioception of core, proximal hip and LE for self care, mobility, lifting, and ambulation      Manual Treatments:  PROM / STM / Oscillations-Mobs:  G-I, II, III, IV (PA's, Inf., Post.)  [x] (53192) Provided manual therapy to mobilize proximal hip and LS spine soft tissue/joints for the purpose of modulating pain, promoting relaxation,  increasing ROM, reducing/eliminating soft tissue swelling/inflammation/restriction, improving soft tissue extensibility and allowing for proper ROM for normal function with self care, mobility, lifting and ambulation. Modalities:  Prone IFC with HP x15'    Charges:  Timed Code Treatment Minutes: 45   Total Treatment Minutes: 60     [] EVAL (LOW) 14781 (typically 20 minutes face-to-face)  [] EVAL (MOD) 29492 (typically 30 minutes face-to-face)  [] EVAL (HIGH) 24310 (typically 45 minutes face-to-face)  [] RE-EVAL     [x] VO(15623) x  2   [] IONTO  [] NMR (94504) x      [] VASO  [x] Manual (39275) x  1    [] Other:  [] TA x       [] Mech Traction (52848)  [] ES(attended) (28279)      [x] ES (un) (44117):     Goals: Patient stated goal: Find a way to ease pain and work with minimal pain    Therapist goals for Patient:   Short Term Goals: To be achieved in: 2 weeks  1. Independent in HEP and progression per patient tolerance, in order to prevent re-injury. 2. Patient will have a decrease in pain to facilitate improvement in movement, function, and ADLs as indicated by Functional Deficits. Long Term Goals: To be achieved in: 6 weeks  1.  Disability index score of 26% or less for the Modified Oswestry  to assist with reaching prior level of function. 2. Patient will demonstrate increased AROM to WNL, good LS mobility, good hip ROM to allow for proper joint functioning as indicated by patients Functional Deficits. 3. Patient will demonstrate an increase in Strength to good proximal hip and core activation to allow for proper functional mobility as indicated by patients Functional Deficits. 4. Patient will return to sleeping through the night, Standing for up to 3 hours at a time during the work day, painfree bed mobility and able to lift correctly and painfree for work. functional activities without increased symptoms or restriction. 5. Find a way to ease pain and work with minimal pain. (patient specific functional goal    Progression Towards Functional goals:  [] Patient is progressing as expected towards functional goals listed. [] Progression is slowed due to complexities listed. [x] Progression has been slowed due to co-morbidities. [] Plan just implemented, too soon to assess goals progression  [x] Other: Pain w ADL's and at work  ASSESSMENT: Discussed the importance of good body mechanics and lifting strategies. Patient says some positions at work she is forced to flex her spine. Discussed the possiblity of a Lumbar Stab brace and plan to contact MD regarding opinion. Treatment/Activity Tolerance:  [x] Patient tolerated treatment well [] Patient limited by fatique  [] Patient limited by pain  [] Patient limited by other medical complications  [] Other:     Prognosis: [] Good [x] Fair  [] Poor    Patient Requires Follow-up: [x] Yes  [] No    PLAN: Add R prone quad S to HEP.   [x] Continue per plan of care [] Alter current plan (see comments)  [] Plan of care initiated [] Hold pending MD visit [] Discharge    Electronically signed by: Faustino Brothers PT

## 2018-02-27 ENCOUNTER — HOSPITAL ENCOUNTER (OUTPATIENT)
Dept: PHYSICAL THERAPY | Age: 58
Discharge: HOME | End: 2018-02-28
Admitting: PHYSICAL MEDICINE & REHABILITATION

## 2018-02-27 NOTE — FLOWSHEET NOTE
RESTRICTIONS/PRECAUTIONS: Aortic valve Replacement/Cardiac Meds, R knee PMM 2012, OA Spine, Cervical Fusion, HTN and Depression  Exercises/Interventions:     Therapeutic Ex sets/reps comments   LTR 10x3\" with TA HEP In clinic 2/27/18   [place] [facility] R/L Ea 30 sec x 3 HEP In clinic 2/27/18   HEP only this visit   HEP   No Money seated EOB with ball ADD at knees 15x5\" HEP   HL ADD set with neutral spine 10x5\" +HEP 2/13   HL B clams with neutral spine 10x5\" +HEP 2/13   Prone lying with a pillow 3 min +HEP 2/16/18   Prone Alt LE\"s     B Shld wall slides for thoracic extension 2 x 10    Scapular retraction Rows and shld ext RTT 3 x 10 each    Prone Quad S R only 20 sec x 5    Bridge 2 x 10    TA stab with alt LE/UE combo 2 x 10    Supine T stretch 3 min with rest prn    Prone on elbows  3 min/1 min    Manual Intervention          and anterior tilt sacral mob, GII-III lumbar/thoracic PA, STM lumbar and thoracic paraspinals. 10'     Prone quad stretch R 2 min                        NMR re-education                               Therapeutic Exercise and NMR EXR  [x] (38565) Provided verbal/tactile cueing for activities related to strengthening, flexibility, endurance, ROM  for improvements in proximal hip and core control with self care, mobility, lifting and ambulation. [x] (92955) Provided verbal/tactile cueing for activities related to improving balance, coordination, kinesthetic sense, posture, motor skill, proprioception  to assist with core control in self care, mobility, lifting, and ambulation.      Therapeutic Activities:    [] (98477 or 98842) Provided verbal/tactile cueing for activities related to improving balance, coordination, kinesthetic sense, posture, motor skill, proprioception and motor activation to allow for proper function  with self care and ADLs  [] (48602) Provided training and instruction to the patient for proper core and proximal hip recruitment and positioning with ambulation re-education     Home

## 2018-03-01 ENCOUNTER — HOSPITAL ENCOUNTER (OUTPATIENT)
Dept: PHYSICAL THERAPY | Age: 58
Discharge: HOME | End: 2018-03-31
Attending: PHYSICAL MEDICINE & REHABILITATION | Admitting: PHYSICAL MEDICINE & REHABILITATION

## 2018-03-02 ENCOUNTER — HOSPITAL ENCOUNTER (OUTPATIENT)
Dept: PHYSICAL THERAPY | Age: 58
Discharge: HOME | End: 2018-03-03
Admitting: PHYSICAL MEDICINE & REHABILITATION

## 2018-03-02 DIAGNOSIS — S39.012D LUMBAR SPINE STRAIN, SUBSEQUENT ENCOUNTER: ICD-10-CM

## 2018-03-02 DIAGNOSIS — M47.25 OTHER SPONDYLOSIS WITH RADICULOPATHY, THORACOLUMBAR REGION: Primary | ICD-10-CM

## 2018-03-02 RX ORDER — ASPIRIN 325 MG
TABLET, DELAYED RELEASE (ENTERIC COATED) ORAL
Qty: 90 TABLET | Refills: 3 | Status: SHIPPED | OUTPATIENT
Start: 2018-03-02 | End: 2018-04-11 | Stop reason: ALTCHOICE

## 2018-03-02 NOTE — FLOWSHEET NOTE
Replacement/Cardiac Meds, R knee PMM 2012, OA Spine, Cervical Fusion, HTN and Depression  Exercises/Interventions:     Therapeutic Ex sets/reps comments   LTR 10x3\" with TA HEP In clinic 2/27/18   [facility] R/L Ea 30 sec x 3 HEP In clinic 2/27/18   HEP only this visit   HEP   No Money seated EOB with ball ADD at knees 15x5\" HEP   HL ADD set with neutral spine 10x5\" +HEP 2/13   HL B clams with neutral spine 10x5\" +HEP 2/13   Prone lying with a pillow 3 min +HEP 2/16/18   Prone Alt LE\"s/Prone HS curls B 2 x 10 each    B Shld wall slides for thoracic extension 2 x 10    Scapular retraction Rows and shld ext RTT 3 x 10 each    Prone Quad S R only 20 sec x 5    Bridge 2 x 10    TA stab with alt LE/UE combo 2 x 10    Supine T stretch 3 min with rest prn    Prone on elbows  3 min/1 min    Manual Intervention         Sacral base mobs and anterior tilt sacral mob, GII-III lumbar/thoracic PA, STM lumbar and thoracic paraspinals. 20 min     Prone quad stretch R 3 min                        NMR re-education                               Therapeutic Exercise and NMR EXR  [x] (00679) Provided verbal/tactile cueing for activities related to strengthening, flexibility, endurance, ROM  for improvements in proximal hip and core control with self care, mobility, lifting and ambulation. [x] (89275) Provided verbal/tactile cueing for activities related to improving balance, coordination, kinesthetic sense, posture, motor skill, proprioception  to assist with core control in self care, mobility, lifting, and ambulation.      Therapeutic Activities:    [] (58593 or 52710) Provided verbal/tactile cueing for activities related to improving balance, coordination, kinesthetic sense, posture, motor skill, proprioception and motor activation to allow for proper function  with self care and ADLs  [] (04948) Provided training and instruction to the patient for proper core and proximal hip recruitment and positioning with ambulation re-education

## 2018-03-05 ENCOUNTER — TELEPHONE (OUTPATIENT)
Dept: ORTHOPEDIC SURGERY | Age: 58
End: 2018-03-05

## 2018-03-06 ENCOUNTER — HOSPITAL ENCOUNTER (OUTPATIENT)
Dept: PHYSICAL THERAPY | Age: 58
Discharge: HOME | End: 2018-03-07
Admitting: PHYSICAL MEDICINE & REHABILITATION

## 2018-03-06 NOTE — FLOWSHEET NOTE
Program:    [x] (66759) Reviewed/Progressed HEP activities related to strengthening, flexibility, endurance, ROM of core, proximal hip and LE for functional self-care, mobility, lifting and ambulation   [] (60280) Reviewed/Progressed HEP activities related to improving balance, coordination, kinesthetic sense, posture, motor skill, proprioception of core, proximal hip and LE for self care, mobility, lifting, and ambulation      Manual Treatments:  PROM / STM / Oscillations-Mobs:  G-I, II, III, IV (PA's, Inf., Post.)  [x] (17144) Provided manual therapy to mobilize proximal hip and LS spine soft tissue/joints for the purpose of modulating pain, promoting relaxation,  increasing ROM, reducing/eliminating soft tissue swelling/inflammation/restriction, improving soft tissue extensibility and allowing for proper ROM for normal function with self care, mobility, lifting and ambulation. Modalities:Seated IFC with HP x15'    Charges:  Timed Code Treatment Minutes: 45   Total Treatment Minutes: 60     [] EVAL (LOW) 46460 (typically 20 minutes face-to-face)  [] EVAL (MOD) 65634 (typically 30 minutes face-to-face)  [] EVAL (HIGH) 16620 (typically 45 minutes face-to-face)  [] RE-EVAL     [x] FU(82059) x  1   [] IONTO  [] NMR (84969) x      [] VASO  [x] Manual (80856) x  2    [] Other:  [] TA x       [] Mech Traction (65270)  [] ES(attended) (12607)      [x] ES (un) (82223):     Goals: Patient stated goal: Find a way to ease pain and work with minimal pain    Therapist goals for Patient:   Short Term Goals: To be achieved in: 2 weeks  1. Independent in HEP and progression per patient tolerance, in order to prevent re-injury. 2. Patient will have a decrease in pain to facilitate improvement in movement, function, and ADLs as indicated by Functional Deficits. Long Term Goals: To be achieved in: 6 weeks  1. Disability index score of 26% or less for the Modified Oswestry  to assist with reaching prior level of function. 2. Patient will demonstrate increased AROM to WNL, good LS mobility, good hip ROM to allow for proper joint functioning as indicated by patients Functional Deficits. 3. Patient will demonstrate an increase in Strength to good proximal hip and core activation to allow for proper functional mobility as indicated by patients Functional Deficits. 4. Patient will return to sleeping through the night, Standing for up to 3 hours at a time during the work day, painfree bed mobility and able to lift correctly and painfree for work. functional activities without increased symptoms or restriction. 5. Find a way to ease pain and work with minimal pain. (patient specific functional goal    Progression Towards Functional goals:  [] Patient is progressing as expected towards functional goals listed. [] Progression is slowed due to complexities listed. [x] Progression has been slowed due to co-morbidities. [] Plan just implemented, too soon to assess goals progression  [x] Other: Pain w ADL's and at work    ASSESSMENT: Patient jah increase from 2 to 3 sets of 10 with no report of increae in thoracic or lumbar spine. Patient reported good support from back brace. Treatment/Activity Tolerance:  [x] Patient tolerated treatment well [] Patient limited by fatique  [] Patient limited by pain  [] Patient limited by other medical complications  [] Other:     Prognosis: [] Good [x] Fair  [] Poor    Patient Requires Follow-up: [x] Yes  [] No    PLAN: Assess patient's response to Orlie Hedges back brace next visit.   .[x] Continue per plan of care [] Alter current plan (see comments)  [] Plan of care initiated [] Hold pending MD visit [] Discharge    Electronically signed by: Wanda Esparza PT

## 2018-03-08 LAB — INR BLD: 1.9

## 2018-03-09 ENCOUNTER — HOSPITAL ENCOUNTER (OUTPATIENT)
Dept: PHYSICAL THERAPY | Age: 58
Discharge: HOME | End: 2018-03-10
Admitting: PHYSICAL MEDICINE & REHABILITATION

## 2018-03-09 ENCOUNTER — ANTI-COAG VISIT (OUTPATIENT)
Dept: CARDIOLOGY CLINIC | Age: 58
End: 2018-03-09

## 2018-03-09 ENCOUNTER — TELEPHONE (OUTPATIENT)
Dept: CARDIOLOGY CLINIC | Age: 58
End: 2018-03-09

## 2018-03-09 NOTE — FLOWSHEET NOTE
[facility], [address] [place] [address]  Phone: [phone]  Fax [phone]    Physical Therapy Daily Treatment Note  Date:  3/9/2018    Patient Name:  Linda Velasco    :  1960  MRN: [id number]  Restrictions/Precautions: H/O  Cervical Fusion  Medical/Treatment Diagnosis Information:  Diagnosis: M47.25 (ICD-10-CM) - Osteoarthritis of spine with radiculopathy, thoracolumbar region  Treatment Diagnosis: Lumbar Pain M54.5, Thoracic Pain Y33.8  Insurance/Certification information:  PT Insurance Information: [place]  Physician Information:  Referring Practitioner: Dr Aisha Workman of care signed (Y/N): Y    Date of Patient follow up with Physician: 79  G-Code (if applicable):      Date G-Code Applied:    PT G-Codes  Functional Assessment Tool Used: Modified Oswestry  Score: 52%  Functional Limitation: Changing and maintaining body position  Changing and Maintaining Body Position Current Status (): At least 40 percent but less than 60 percent impaired, limited or restricted  Changing and Maintaining Body Position Goal Status (): At least 20 percent but less than 40 percent impaired, limited or restricted    Progress Note: []  Yes  [x]  No  Next due by: Visit #10      Latex Allergy:  [x]NO      []YES  Preferred Language for Healthcare:   [x]English       []other:     Visit # Insurance Allowable Requires auth   9 45 remain (5 used)    [x]no        []yes:     Pain level: 3/10    SUBJECTIVE: Patient reports that the back brace has helped a lot at work and at home. Has brought the pain down a lot. OBJECTIVE:   Patient was fit for [address] [place] [address] today with DME. Less \"spring\" in Thoracic vertebrae when compared to Lumbar. Observation: Pt is significantly tight in R lower lumbar paraspinals and R lower thoracic paraspinals.   Test measurements:      RESTRICTIONS/PRECAUTIONS: Aortic valve
spent with PT assistant 19'

## 2018-03-09 NOTE — TELEPHONE ENCOUNTER
Spoke to pt. INR is 1.9. Per protocol, dosing change is 4 mg Fri and Sat ONLY. Restart regimen at 2 mg daily. Recheck 1 week(s).  Provider notified. ~ Lisandro Pluido RN

## 2018-03-13 ENCOUNTER — HOSPITAL ENCOUNTER (OUTPATIENT)
Dept: PHYSICAL THERAPY | Age: 58
Discharge: HOME | End: 2018-03-14
Admitting: PHYSICAL MEDICINE & REHABILITATION

## 2018-03-13 NOTE — FLOWSHEET NOTE
the Modified Oswestry  to assist with reaching prior level of function. PROGRESSING  2. Patient will demonstrate increased AROM to WNL, good LS mobility, good hip ROM to allow for proper joint functioning as indicated by patients Functional Deficits. PROGRESSING  3. Patient will demonstrate an increase in Strength to good proximal hip and core activation to allow for proper functional mobility as indicated by patients Functional Deficits. PROGRESSING  4. Patient will return to sleeping through the night, Standing for up to 3 hours at a time during the work day, painfree bed mobility and able to lift correctly and painfree for work. functional activities without increased symptoms or restriction. PROGRESSING  5. Find a way to ease pain and work with minimal pain. (patient specific functional goal PROGRESSING    Progression Towards Functional goals:  [x] Patient is progressing as expected towards functional goals listed. [] Progression is slowed due to complexities listed. [] Progression has been slowed due to co-morbidities. [] Plan just implemented, too soon to assess goals progression  [x] Other: Pain w ADL's and at work    ASSESSMENT: SEE POC NOTE    Treatment/Activity Tolerance:  [x] Patient tolerated treatment well [] Patient limited by fatique  [] Patient limited by pain  [] Patient limited by other medical complications  [] Other:     Prognosis: [] Good [x] Fair  [] Poor    Patient Requires Follow-up: [x] Yes  [] No    PLAN: Continue PT 2x per wk x 3 weeks and progress toward DC.   .[x] Continue per plan of care [] Alter current plan (see comments)  [] Plan of care initiated [] Hold pending MD visit [] Discharge    Electronically signed by: Della Newby PT

## 2018-03-15 LAB — INR BLD: 3.4

## 2018-03-16 ENCOUNTER — HOSPITAL ENCOUNTER (OUTPATIENT)
Dept: PHYSICAL THERAPY | Age: 58
Discharge: HOME | End: 2018-03-17
Admitting: PHYSICAL MEDICINE & REHABILITATION

## 2018-03-16 ENCOUNTER — TELEPHONE (OUTPATIENT)
Dept: INTERNAL MEDICINE CLINIC | Age: 58
End: 2018-03-16

## 2018-03-16 ENCOUNTER — OFFICE VISIT (OUTPATIENT)
Dept: INTERNAL MEDICINE CLINIC | Age: 58
End: 2018-03-16

## 2018-03-16 ENCOUNTER — TELEPHONE (OUTPATIENT)
Dept: CARDIOLOGY CLINIC | Age: 58
End: 2018-03-16

## 2018-03-16 ENCOUNTER — ANTI-COAG VISIT (OUTPATIENT)
Dept: CARDIOLOGY CLINIC | Age: 58
End: 2018-03-16

## 2018-03-16 VITALS
HEART RATE: 68 BPM | WEIGHT: 144 LBS | SYSTOLIC BLOOD PRESSURE: 120 MMHG | OXYGEN SATURATION: 96 % | BODY MASS INDEX: 25.59 KG/M2 | DIASTOLIC BLOOD PRESSURE: 78 MMHG

## 2018-03-16 DIAGNOSIS — R51.9 ACUTE NONINTRACTABLE HEADACHE, UNSPECIFIED HEADACHE TYPE: ICD-10-CM

## 2018-03-16 DIAGNOSIS — Z72.0 TOBACCO USE: ICD-10-CM

## 2018-03-16 DIAGNOSIS — H66.90 ACUTE OTITIS MEDIA, UNSPECIFIED OTITIS MEDIA TYPE: Primary | ICD-10-CM

## 2018-03-16 DIAGNOSIS — R42 DIZZINESS: ICD-10-CM

## 2018-03-16 DIAGNOSIS — I10 ESSENTIAL HYPERTENSION: ICD-10-CM

## 2018-03-16 PROCEDURE — 99213 OFFICE O/P EST LOW 20 MIN: CPT | Performed by: INTERNAL MEDICINE

## 2018-03-16 RX ORDER — AMOXICILLIN AND CLAVULANATE POTASSIUM 875; 125 MG/1; MG/1
1 TABLET, FILM COATED ORAL 2 TIMES DAILY
Qty: 20 TABLET | Refills: 0 | Status: SHIPPED | OUTPATIENT
Start: 2018-03-16 | End: 2018-05-11 | Stop reason: ALTCHOICE

## 2018-03-16 NOTE — TELEPHONE ENCOUNTER
Call patient: [facility] will need to monitor INR  closely  while on antibiotics  Dr. schultz

## 2018-03-16 NOTE — PROGRESS NOTES
Subjective:      Patient ID: Iker Garcia is a 56 y.o. female. Cc: Ears clogged up  HPI: Patient relates for a few months \"my ears are bothering me\". She complains they feel clogged up and some decrease in hearing right greater than left. Forehead headaches, occasional sense of dizziness. Has been using Flonase nasal spray and Mucinex. Patient denies nasal congestion or drainage, sore throat, no fever chills or shakes, no cough. Of significance tobacco use. Patient with HTN, tobacco use, arthritis, back pain, aortic valve replacement on Coumadin. Medicines and allergies reviewed  Health maintenance reviewed  Social history reviewed  Family history reviewed    Review of Systems    Review of Systems   Constitutional: negative   HENT:  Ears on file clogged up. Decrease in hearing. EYES: negative   Respiratory:  Tobacco use. Gastrointestinal: negative   Endocrine: negative   Musculoskeletal: negative   Skin: negative   Allergic/Immunological: negative   Hematological: negative   Psychiatric/Behavorial: negative   CV: negative   CNS: negative   :Negative   S/E:Negative  Renal: Negative      Objective:   Physical Exam : Lungs: Clear to auscultation and no wheezing although limited secondary to back brace. CV: G6-A8 normal.  Systolic murmur radiation is a carotids bilaterally from her aortic valve replacement. Neck: No lymphadenopathy. Thyroid: Not palpable and nontender to touch. Throat: No erythema or exudates. Ears: On the left: Canals clear. TMs without erythema or bulging. On the right: Canal is clear. TM with mild bulging and mild erythema and fullness. No active drainage. Spine/extremities: No ankle edema. Skin: No rash. Blood pressure 120/78, pulse 68, weight 144 lb (65.3 kg), SpO2 96 %, not currently breastfeeding. Assessment:      1. Otitis media  2. Dizziness  3. Headaches  4. HTN: Stable  5. Tobacco use  6. Aortic valve replacement on Coumadin      Plan:     1. Augmentin 875 one p.o. b.i.d. take with food. Warned about possible GI upset. 2.  Probiotics over-the-counter  3. Flonase nasal spray  4.   Claritin over-the-counter  Will need referral to ENT if problem persist  We need to monitor INR closely on antibiotics  Dr. schultz

## 2018-03-22 ENCOUNTER — HOSPITAL ENCOUNTER (OUTPATIENT)
Dept: PHYSICAL THERAPY | Age: 58
Discharge: HOME | End: 2018-03-23
Admitting: PHYSICAL MEDICINE & REHABILITATION

## 2018-03-22 NOTE — FLOWSHEET NOTE
proprioception  to assist with core control in self care, mobility, lifting, and ambulation. Therapeutic Activities:    [] (05089 or 90286) Provided verbal/tactile cueing for activities related to improving balance, coordination, kinesthetic sense, posture, motor skill, proprioception and motor activation to allow for proper function  with self care and ADLs  [] (80374) Provided training and instruction to the patient for proper core and proximal hip recruitment and positioning with ambulation re-education     Home Exercise Program:    [x] (03642) Reviewed/Progressed HEP activities related to strengthening, flexibility, endurance, ROM of core, proximal hip and LE for functional self-care, mobility, lifting and ambulation   [] (14963) Reviewed/Progressed HEP activities related to improving balance, coordination, kinesthetic sense, posture, motor skill, proprioception of core, proximal hip and LE for self care, mobility, lifting, and ambulation      Manual Treatments:  PROM / STM / Oscillations-Mobs:  G-I, II, III, IV (PA's, Inf., Post.)  [x] (61138) Provided manual therapy to mobilize proximal hip and LS spine soft tissue/joints for the purpose of modulating pain, promoting relaxation,  increasing ROM, reducing/eliminating soft tissue swelling/inflammation/restriction, improving soft tissue extensibility and allowing for proper ROM for normal function with self care, mobility, lifting and ambulation.      Modalities:Seated Pre-mod with HP x15'    Charges:  Timed Code Treatment Minutes: 47   Total Treatment Minutes: 62     [] EVAL (LOW) 28081 (typically 20 minutes face-to-face)  [] EVAL (MOD) 99261 (typically 30 minutes face-to-face)  [] EVAL (HIGH) 52321 (typically 45 minutes face-to-face)  [] RE-EVAL     [x] AI(54363) x  2   [] IONTO  [] NMR (09577) x      [] VASO  [x] Manual (45957) x  1    [] Other:  [] TA x       [] Mech Traction (59376)  [] ES(attended) (84263)      [x] ES (un) (82317):     Goals: Patient

## 2018-03-30 ENCOUNTER — ANTI-COAG VISIT (OUTPATIENT)
Dept: CARDIOLOGY CLINIC | Age: 58
End: 2018-03-30

## 2018-03-30 LAB — INR BLD: 3

## 2018-03-30 NOTE — TELEPHONE ENCOUNTER
Attempted to call pt. Ring once then hang up. INR is 3.0. Per protocol, no changes. Continue 2 mg daily. Recheck 1 week(s).  Provider notified. ~ Chester Luu RN

## 2018-04-01 ENCOUNTER — HOSPITAL ENCOUNTER (OUTPATIENT)
Dept: PHYSICAL THERAPY | Age: 58
Discharge: HOME | End: 2018-04-30
Attending: PHYSICAL MEDICINE & REHABILITATION | Admitting: PHYSICAL MEDICINE & REHABILITATION

## 2018-04-02 RX ORDER — HYDROCHLOROTHIAZIDE 25 MG/1
TABLET ORAL
Qty: 90 TABLET | Refills: 1 | Status: SHIPPED | OUTPATIENT
Start: 2018-04-02 | End: 2018-04-10

## 2018-04-02 RX ORDER — WARFARIN SODIUM 2 MG/1
TABLET ORAL
Qty: 90 TABLET | Refills: 0 | Status: SHIPPED | OUTPATIENT
Start: 2018-04-02 | End: 2018-07-11 | Stop reason: SDUPTHER

## 2018-04-10 RX ORDER — HYDROCHLOROTHIAZIDE 25 MG/1
TABLET ORAL
Qty: 90 TABLET | Refills: 1 | Status: SHIPPED | OUTPATIENT
Start: 2018-04-10 | End: 2019-04-10 | Stop reason: SDUPTHER

## 2018-04-11 ENCOUNTER — OFFICE VISIT (OUTPATIENT)
Dept: ORTHOPEDIC SURGERY | Age: 58
End: 2018-04-11

## 2018-04-11 VITALS
WEIGHT: 143.96 LBS | HEART RATE: 62 BPM | HEIGHT: 63 IN | SYSTOLIC BLOOD PRESSURE: 117 MMHG | BODY MASS INDEX: 25.51 KG/M2 | DIASTOLIC BLOOD PRESSURE: 81 MMHG

## 2018-04-11 DIAGNOSIS — S39.012D STRAIN OF LUMBAR REGION, SUBSEQUENT ENCOUNTER: Primary | ICD-10-CM

## 2018-04-11 DIAGNOSIS — M47.816 LUMBAR SPONDYLOSIS: ICD-10-CM

## 2018-04-11 DIAGNOSIS — M47.814 SPONDYLOSIS OF THORACIC REGION WITHOUT MYELOPATHY OR RADICULOPATHY: ICD-10-CM

## 2018-04-11 PROCEDURE — 99214 OFFICE O/P EST MOD 30 MIN: CPT | Performed by: PHYSICAL MEDICINE & REHABILITATION

## 2018-04-11 RX ORDER — DICLOFENAC SODIUM 75 MG/1
TABLET, DELAYED RELEASE ORAL
Qty: 60 TABLET | Refills: 1 | Status: SHIPPED | OUTPATIENT
Start: 2018-04-11 | End: 2019-01-01 | Stop reason: CLARIF

## 2018-04-11 RX ORDER — METHOCARBAMOL 750 MG/1
TABLET, FILM COATED ORAL
Qty: 60 TABLET | Refills: 1 | Status: SHIPPED | OUTPATIENT
Start: 2018-04-11 | End: 2020-01-01 | Stop reason: CLARIF

## 2018-04-19 ENCOUNTER — ANTI-COAG VISIT (OUTPATIENT)
Dept: CARDIOLOGY CLINIC | Age: 58
End: 2018-04-19

## 2018-04-19 ENCOUNTER — TELEPHONE (OUTPATIENT)
Dept: CARDIOLOGY CLINIC | Age: 58
End: 2018-04-19

## 2018-04-19 LAB — INR BLD: 3

## 2018-05-04 LAB — INR BLD: 4.5

## 2018-05-04 RX ORDER — POTASSIUM CHLORIDE 750 MG/1
TABLET, EXTENDED RELEASE ORAL
Qty: 180 TABLET | Refills: 2 | Status: SHIPPED | OUTPATIENT
Start: 2018-05-04 | End: 2019-08-22 | Stop reason: SDUPTHER

## 2018-05-07 ENCOUNTER — ANTI-COAG VISIT (OUTPATIENT)
Dept: CARDIOLOGY CLINIC | Age: 58
End: 2018-05-07

## 2018-05-07 ENCOUNTER — TELEPHONE (OUTPATIENT)
Dept: CARDIOLOGY CLINIC | Age: 58
End: 2018-05-07

## 2018-05-07 ENCOUNTER — HOSPITAL ENCOUNTER (OUTPATIENT)
Dept: GENERAL RADIOLOGY | Age: 58
Discharge: HOME | End: 2018-05-07
Attending: INTERNAL MEDICINE | Admitting: INTERNAL MEDICINE

## 2018-05-07 DIAGNOSIS — I10 ESSENTIAL HYPERTENSION: ICD-10-CM

## 2018-05-07 DIAGNOSIS — R53.83 FATIGUE, UNSPECIFIED TYPE: ICD-10-CM

## 2018-05-07 DIAGNOSIS — E78.5 HYPERLIPIDEMIA, UNSPECIFIED HYPERLIPIDEMIA TYPE: ICD-10-CM

## 2018-05-07 LAB
A/G RATIO: 1.5 (ref 1.1–2.2)
ALBUMIN SERPL-MCNC: 3.9 G/DL (ref 3.4–5)
ALP BLD-CCNC: 84 U/L (ref 40–129)
ALT SERPL-CCNC: 19 U/L (ref 10–40)
ANION GAP SERPL CALCULATED.3IONS-SCNC: 14 MMOL/L (ref 3–16)
AST SERPL-CCNC: 20 U/L (ref 15–37)
BILIRUB SERPL-MCNC: 0.4 MG/DL (ref 0–1)
BUN BLDV-MCNC: 7 MG/DL (ref 7–20)
CALCIUM SERPL-MCNC: 8.5 MG/DL (ref 8.3–10.6)
CHLORIDE BLD-SCNC: 101 MMOL/L (ref 99–110)
CHOLESTEROL, TOTAL: 170 MG/DL (ref 0–199)
CO2: 28 MMOL/L (ref 21–32)
CREAT SERPL-MCNC: 0.6 MG/DL (ref 0.6–1.1)
GFR AFRICAN AMERICAN: >60
GFR NON-AFRICAN AMERICAN: >60
GLOBULIN: 2.6 G/DL
GLUCOSE BLD-MCNC: 120 MG/DL (ref 70–99)
HCT VFR BLD CALC: 39.7 % (ref 36–48)
HDLC SERPL-MCNC: 34 MG/DL (ref 40–60)
HEMOGLOBIN: 13.2 G/DL (ref 12–16)
LDL CHOLESTEROL CALCULATED: 82 MG/DL
MCH RBC QN AUTO: 26.8 PG (ref 26–34)
MCHC RBC AUTO-ENTMCNC: 33.1 G/DL (ref 31–36)
MCV RBC AUTO: 80.9 FL (ref 80–100)
PDW BLD-RTO: 16.8 % (ref 12.4–15.4)
PLATELET # BLD: 153 K/UL (ref 135–450)
PMV BLD AUTO: 11 FL (ref 5–10.5)
POTASSIUM SERPL-SCNC: 3.5 MMOL/L (ref 3.5–5.1)
RBC # BLD: 4.91 M/UL (ref 4–5.2)
SODIUM BLD-SCNC: 143 MMOL/L (ref 136–145)
T4 FREE: 1.2 NG/DL (ref 0.9–1.8)
TOTAL PROTEIN: 6.5 G/DL (ref 6.4–8.2)
TRIGL SERPL-MCNC: 268 MG/DL (ref 0–150)
TSH SERPL DL<=0.05 MIU/L-ACNC: 2.62 UIU/ML (ref 0.27–4.2)
VLDLC SERPL CALC-MCNC: 54 MG/DL
WBC # BLD: 6 K/UL (ref 4–11)

## 2018-05-11 ENCOUNTER — OFFICE VISIT (OUTPATIENT)
Dept: INTERNAL MEDICINE CLINIC | Age: 58
End: 2018-05-11

## 2018-05-11 VITALS
OXYGEN SATURATION: 96 % | SYSTOLIC BLOOD PRESSURE: 110 MMHG | WEIGHT: 140 LBS | DIASTOLIC BLOOD PRESSURE: 50 MMHG | BODY MASS INDEX: 24.87 KG/M2 | HEART RATE: 60 BPM

## 2018-05-11 DIAGNOSIS — R73.01 IFG (IMPAIRED FASTING GLUCOSE): ICD-10-CM

## 2018-05-11 DIAGNOSIS — I10 ESSENTIAL HYPERTENSION: Primary | ICD-10-CM

## 2018-05-11 DIAGNOSIS — Z72.0 TOBACCO USE: ICD-10-CM

## 2018-05-11 DIAGNOSIS — I35.9 AORTIC VALVE DISEASE: ICD-10-CM

## 2018-05-11 DIAGNOSIS — E78.5 HYPERLIPIDEMIA, UNSPECIFIED HYPERLIPIDEMIA TYPE: ICD-10-CM

## 2018-05-11 DIAGNOSIS — M19.90 ARTHRITIS: ICD-10-CM

## 2018-05-11 LAB — INR BLD: 2.5

## 2018-05-11 PROCEDURE — 99214 OFFICE O/P EST MOD 30 MIN: CPT | Performed by: INTERNAL MEDICINE

## 2018-05-14 ENCOUNTER — ANTI-COAG VISIT (OUTPATIENT)
Dept: CARDIOLOGY CLINIC | Age: 58
End: 2018-05-14

## 2018-05-14 ENCOUNTER — TELEPHONE (OUTPATIENT)
Dept: CARDIOLOGY CLINIC | Age: 58
End: 2018-05-14

## 2018-05-29 LAB — INR BLD: 3.5

## 2018-05-30 ENCOUNTER — ANTI-COAG VISIT (OUTPATIENT)
Dept: CARDIOLOGY CLINIC | Age: 58
End: 2018-05-30

## 2018-05-30 ENCOUNTER — TELEPHONE (OUTPATIENT)
Dept: CARDIOLOGY CLINIC | Age: 58
End: 2018-05-30

## 2018-06-25 LAB — INR BLD: 3.5

## 2018-06-26 ENCOUNTER — ANTI-COAG VISIT (OUTPATIENT)
Dept: CARDIOLOGY CLINIC | Age: 58
End: 2018-06-26

## 2018-06-26 ENCOUNTER — TELEPHONE (OUTPATIENT)
Dept: CARDIOLOGY CLINIC | Age: 58
End: 2018-06-26

## 2018-07-03 ENCOUNTER — HOSPITAL ENCOUNTER (OUTPATIENT)
Dept: GENERAL RADIOLOGY | Age: 58
Discharge: HOME | End: 2018-07-03
Attending: NURSE PRACTITIONER | Admitting: NURSE PRACTITIONER

## 2018-07-03 ENCOUNTER — OFFICE VISIT (OUTPATIENT)
Dept: INTERNAL MEDICINE CLINIC | Age: 58
End: 2018-07-03

## 2018-07-03 VITALS
SYSTOLIC BLOOD PRESSURE: 132 MMHG | HEART RATE: 70 BPM | DIASTOLIC BLOOD PRESSURE: 78 MMHG | BODY MASS INDEX: 24.51 KG/M2 | WEIGHT: 138 LBS | OXYGEN SATURATION: 98 %

## 2018-07-03 DIAGNOSIS — L25.9 CONTACT DERMATITIS, UNSPECIFIED CONTACT DERMATITIS TYPE, UNSPECIFIED TRIGGER: Primary | ICD-10-CM

## 2018-07-03 DIAGNOSIS — R21 RASH OF HANDS: ICD-10-CM

## 2018-07-03 LAB
A/G RATIO: 1.7 (ref 1.1–2.2)
ALBUMIN SERPL-MCNC: 4.5 G/DL (ref 3.4–5)
ALP BLD-CCNC: 94 U/L (ref 40–129)
ALT SERPL-CCNC: 24 U/L (ref 10–40)
ANION GAP SERPL CALCULATED.3IONS-SCNC: 12 MMOL/L (ref 3–16)
AST SERPL-CCNC: 22 U/L (ref 15–37)
BASOPHILS ABSOLUTE: 0.1 K/UL (ref 0–0.2)
BASOPHILS RELATIVE PERCENT: 1.1 %
BILIRUB SERPL-MCNC: 0.3 MG/DL (ref 0–1)
BUN BLDV-MCNC: 11 MG/DL (ref 7–20)
CALCIUM SERPL-MCNC: 9.9 MG/DL (ref 8.3–10.6)
CHLORIDE BLD-SCNC: 99 MMOL/L (ref 99–110)
CO2: 29 MMOL/L (ref 21–32)
CREAT SERPL-MCNC: 0.7 MG/DL (ref 0.6–1.1)
EOSINOPHILS ABSOLUTE: 0.3 K/UL (ref 0–0.6)
EOSINOPHILS RELATIVE PERCENT: 3.4 %
GFR AFRICAN AMERICAN: >60
GFR NON-AFRICAN AMERICAN: >60
GLOBULIN: 2.7 G/DL
GLUCOSE BLD-MCNC: 86 MG/DL (ref 70–99)
HCT VFR BLD CALC: 42.5 % (ref 36–48)
HEMOGLOBIN: 14.6 G/DL (ref 12–16)
LYMPHOCYTES ABSOLUTE: 1.7 K/UL (ref 1–5.1)
LYMPHOCYTES RELATIVE PERCENT: 21 %
MCH RBC QN AUTO: 27.4 PG (ref 26–34)
MCHC RBC AUTO-ENTMCNC: 34.4 G/DL (ref 31–36)
MCV RBC AUTO: 79.5 FL (ref 80–100)
MONOCYTES ABSOLUTE: 0.5 K/UL (ref 0–1.3)
MONOCYTES RELATIVE PERCENT: 6.3 %
NEUTROPHILS ABSOLUTE: 5.6 K/UL (ref 1.7–7.7)
NEUTROPHILS RELATIVE PERCENT: 68.2 %
PDW BLD-RTO: 16.4 % (ref 12.4–15.4)
PLATELET # BLD: 153 K/UL (ref 135–450)
PMV BLD AUTO: 10.9 FL (ref 5–10.5)
POTASSIUM SERPL-SCNC: 4.1 MMOL/L (ref 3.5–5.1)
RBC # BLD: 5.35 M/UL (ref 4–5.2)
SEDIMENTATION RATE, ERYTHROCYTE: 10 MM/HR (ref 0–30)
SODIUM BLD-SCNC: 140 MMOL/L (ref 136–145)
TOTAL PROTEIN: 7.2 G/DL (ref 6.4–8.2)
WBC # BLD: 8.2 K/UL (ref 4–11)

## 2018-07-03 PROCEDURE — 99213 OFFICE O/P EST LOW 20 MIN: CPT | Performed by: NURSE PRACTITIONER

## 2018-07-03 RX ORDER — METHYLPREDNISOLONE 4 MG/1
TABLET ORAL
Qty: 1 KIT | Refills: 0 | Status: SHIPPED | OUTPATIENT
Start: 2018-07-03 | End: 2018-07-09

## 2018-07-03 ASSESSMENT — ENCOUNTER SYMPTOMS
DIARRHEA: 0
VOMITING: 0
NAUSEA: 0
COUGH: 0
WHEEZING: 0
EYES NEGATIVE: 1
CHEST TIGHTNESS: 0
SHORTNESS OF BREATH: 0
ALLERGIC/IMMUNOLOGIC NEGATIVE: 1

## 2018-07-03 NOTE — PROGRESS NOTES
descending artery repair       Family History   Problem Relation Age of Onset    Stroke Mother     Pacemaker Mother     Stroke Father     Heart Disease Father 61        MI    High Blood Pressure Father        Social History     Social History    Marital status:      Spouse name: N/A    Number of children: N/A    Years of education: N/A     Occupational History    Not on file. Social History Main Topics    Smoking status: Current Every Day Smoker     Packs/day: 1.00     Years: 20.00    Smokeless tobacco: Never Used    Alcohol use Yes      Comment: 2 x months    Drug use: No    Sexual activity: Yes     Partners: Male     Other Topics Concern    Not on file     Social History Narrative    No narrative on file       Review of Systems   Constitutional: Negative for chills, fatigue and fever. HENT: Negative. Eyes: Negative. Respiratory: Negative for cough, chest tightness, shortness of breath and wheezing. Cardiovascular: Negative for chest pain. Gastrointestinal: Negative for diarrhea, nausea and vomiting. Endocrine: Negative. Genitourinary: Negative. Musculoskeletal: Positive for arthralgias. Negative for myalgias. Skin: Rash: hands. Allergic/Immunologic: Negative. Neurological: Negative. Hematological: Negative. Psychiatric/Behavioral: Negative. OBJECTIVE:  /78 (Site: Right Arm, Position: Sitting, Cuff Size: Medium Adult)   Pulse 70   Wt 138 lb (62.6 kg)   SpO2 98%   BMI 24.51 kg/m²     Physical Exam   Constitutional: She is oriented to person, place, and time. She appears well-developed and well-nourished. HENT:   Head: Normocephalic and atraumatic. Mouth/Throat: Oropharynx is clear and moist.   Eyes: Conjunctivae are normal. Pupils are equal, round, and reactive to light. No scleral icterus. Neck: Normal range of motion. Neck supple. Cardiovascular: Normal rate, regular rhythm and normal heart sounds.     Pulmonary/Chest: Effort normal and breath sounds normal. No respiratory distress. Abdominal: Soft. Normal appearance and bowel sounds are normal. There is no hepatosplenomegaly. There is no CVA tenderness. Musculoskeletal: Normal range of motion. Neurological: She is alert and oriented to person, place, and time. Skin: Skin is warm, dry and intact. Rash noted. Rash is pustular (noted on fingers at ip joints and on palm of hand closet to palmar nodule bilaterally). Psychiatric: She has a normal mood and affect. Her speech is normal and behavior is normal. Judgment and thought content normal.   Vitals reviewed. ASSESSMENT/PLAN:    1. Contact dermatitis, unspecified contact dermatitis type, unspecified trigger  If there is no improvement/worsening of condition please contact the office for further follow up. Use cool compresses to area. Avoid irritating substances. Use cream and medication as prescribed. Avoiding itching if at all possible. Use gloves when necessary, use mild soap like dove or Cetaphil soap and dry well. Use moisturizers as a barrier. (Aquaphor, etc.)  Okay to use antihistamine.    - mupirocin (BACTROBAN) 2 % ointment; Apply 3 times daily. Dispense: 1 Tube; Refill: 0  - methylPREDNISolone (MEDROL DOSEPACK) 4 MG tablet; Take by mouth. Dispense: 1 kit; Refill: 0    2. Rash of hands  See above instructions. Please refer to educational handout.    - CBC Auto Differential; Future  - SEDIMENTATION RATE; Future  - COMPREHENSIVE METABOLIC PANEL; Future  - CALCIUM; Future      Return in about 1 week (around 7/10/2018).

## 2018-07-03 NOTE — PROGRESS NOTES
SUBJECTIVE:  Trung hamilton 66 y. o.female    Chief Complaint   Patient presents with    Rash       Patient presents with small pinpoint blisters noted to hand bilaterally especially at joint areas. They are painful when the start to appear, blisters will change color the soreness is gone then after couple days peel then go away and cycle to return and start process over again. This has been on going for a month. Past Medical History:   Diagnosis Date    Anxiety     Anxiety     Aortic valve defect 2008    congenital,Ao replaced,Coronary angiogram= Normal    Depression     HTN (hypertension)     Hyperlipidemia     Hyperlipidemia     Hypertension     LONG TERM ANTICOAGULENT USE     Palpitations     Pneumonia     Psoriasis        Past Surgical History:   Procedure Laterality Date    AORTIC VALVE REPLACEMENT       4 years ago    AORTIC VALVE REPLACEMENT      CARDIAC VALVE REPLACEMENT  9/25/2008    CERVICAL DISCECTOMY  2005    CERVICAL FUSION  2006    COLONOSCOPY  5/8/14    Tubular Adenoma. Internal Hemorrhoids. Redo 5 years.  HERNIA REPAIR      HYSTERECTOMY  1998    BSO    HYSTERECTOMY, TOTAL ABDOMINAL      KNEE ARTHROSCOPY  9/25/12    right partial medial menisectomy    LAPAROSCOPY  1997 & 1994    endometriasis    NASAL SEPTUM SURGERY  2005    NASAL SEPTUM SURGERY      VASCULAR SURGERY      descending artery repair       Family History   Problem Relation Age of Onset    Stroke Mother     Pacemaker Mother     Stroke Father     Heart Disease Father 61        MI    High Blood Pressure Father        Social History     Social History    Marital status:      Spouse name: N/A    Number of children: N/A    Years of education: N/A     Occupational History    Not on file.      Social History Main Topics    Smoking status: Current Every Day Smoker     Packs/day: 1.00     Years: 20.00    Smokeless tobacco: Never Used    Alcohol use Yes      Comment: 2 x months    Drug

## 2018-07-03 NOTE — PATIENT INSTRUCTIONS
· You do not get better after 1 week. Where can you learn more? Go to [email or web address] and sign in to your [employer] account. Enter Q089 in the [employer] box to learn more about \"Rash: Care Instructions. \"     If you do not have an account, please click on the \"Sign Up Now\" link. Current as of: October 5, 2017  Content Version: 11.6  © 4165-6731 [employer]. Care instructions adapted under license by [facility] ([facility]). If you have questions about a medical condition or this instruction, always ask your healthcare professional. [address] any warranty or liability for your use of this information.

## 2018-07-10 ENCOUNTER — OFFICE VISIT (OUTPATIENT)
Dept: INTERNAL MEDICINE CLINIC | Age: 58
End: 2018-07-10

## 2018-07-10 VITALS
TEMPERATURE: 98.2 F | DIASTOLIC BLOOD PRESSURE: 76 MMHG | WEIGHT: 138 LBS | HEIGHT: 63 IN | SYSTOLIC BLOOD PRESSURE: 130 MMHG | BODY MASS INDEX: 24.45 KG/M2

## 2018-07-10 DIAGNOSIS — M79.641 PAIN IN BOTH HANDS: ICD-10-CM

## 2018-07-10 DIAGNOSIS — R21 RASH OF HANDS: ICD-10-CM

## 2018-07-10 DIAGNOSIS — M79.642 PAIN IN BOTH HANDS: ICD-10-CM

## 2018-07-10 DIAGNOSIS — L25.9 CONTACT DERMATITIS, UNSPECIFIED CONTACT DERMATITIS TYPE, UNSPECIFIED TRIGGER: Primary | ICD-10-CM

## 2018-07-10 PROCEDURE — 99212 OFFICE O/P EST SF 10 MIN: CPT | Performed by: NURSE PRACTITIONER

## 2018-07-10 ASSESSMENT — ENCOUNTER SYMPTOMS
ALLERGIC/IMMUNOLOGIC NEGATIVE: 1
EYES NEGATIVE: 1
RESPIRATORY NEGATIVE: 1
GASTROINTESTINAL NEGATIVE: 1

## 2018-07-10 NOTE — PROGRESS NOTES
Number of children: N/A    Years of education: N/A     Occupational History    Not on file. Social History Main Topics    Smoking status: Current Every Day Smoker     Packs/day: 1.00     Years: 20.00    Smokeless tobacco: Never Used    Alcohol use Yes      Comment: 2 x months    Drug use: No    Sexual activity: Yes     Partners: Male     Other Topics Concern    Not on file     Social History Narrative    No narrative on file       Review of Systems   Constitutional: Negative. HENT: Negative. Eyes: Negative. Respiratory: Negative. Cardiovascular: Negative. Gastrointestinal: Negative. Endocrine: Negative. Genitourinary: Negative. Musculoskeletal: Negative. Skin: Negative. Allergic/Immunologic: Negative. Neurological: Negative. Hematological: Negative. Psychiatric/Behavioral: Negative. OBJECTIVE:  /76   Temp 98.2 °F (36.8 °C) (Oral)   Ht 5' 3\" (1.6 m)   Wt 138 lb (62.6 kg)   BMI 24.45 kg/m²     Physical Exam   Constitutional: She is oriented to person, place, and time. She appears well-developed and well-nourished. HENT:   Head: Normocephalic and atraumatic. Mouth/Throat: Oropharynx is clear and moist.   Eyes: Conjunctivae are normal. Pupils are equal, round, and reactive to light. No scleral icterus. Neck: Normal range of motion. Neck supple. Cardiovascular: Normal rate, regular rhythm and normal heart sounds. Pulmonary/Chest: Effort normal and breath sounds normal. No respiratory distress. Abdominal: Soft. Normal appearance and bowel sounds are normal. There is no hepatosplenomegaly. There is no CVA tenderness. Musculoskeletal: Normal range of motion. Neurological: She is alert and oriented to person, place, and time. Skin: Skin is warm, dry and intact. Psychiatric: She has a normal mood and affect. Her speech is normal and behavior is normal. Judgment and thought content normal.   Vitals reviewed. ASSESSMENT/PLAN:    1. Contact dermatitis, unspecified contact dermatitis type, unspecified trigger  If there is no improvement/worsening of condition please contact the office for further follow up. Use cool compresses to area. Avoid irritating substances. Continue medication as prescribed. Avoiding itching if at all possible. Use gloves when necessary, use mild soap like dove or Cetaphil soap and dry well. Use moisturizers as a barrier. (Aquaphor, etc.)    2. Pain in both hands  Notify office if you have no improvement or worsening of condition. May use tylenol for pain as needed. See above plan. 3. Rash of hands  Notify office if you have no improvement or worsening of condition  Use medication as prescribed. Return if symptoms worsen or fail to improve, for follow up with Dr. Andry Rowan in November as scheduled. Octavia Herman

## 2018-07-11 RX ORDER — WARFARIN SODIUM 2 MG/1
TABLET ORAL
Qty: 90 TABLET | Refills: 3 | Status: SHIPPED | OUTPATIENT
Start: 2018-07-11 | End: 2019-07-25 | Stop reason: SDUPTHER

## 2018-07-12 LAB — INR BLD: 3.5

## 2018-07-13 ENCOUNTER — ANTI-COAG VISIT (OUTPATIENT)
Dept: CARDIOLOGY CLINIC | Age: 58
End: 2018-07-13

## 2018-07-13 ENCOUNTER — TELEPHONE (OUTPATIENT)
Dept: CARDIOLOGY CLINIC | Age: 58
End: 2018-07-13

## 2018-07-13 NOTE — TELEPHONE ENCOUNTER
LMOVM. INR is 3.5. Per protocol, no changes. Continue 2 mg daily. Recheck 4 week(s).  Provider notified. ~ Ravindra Morfin RN

## 2018-07-16 ENCOUNTER — TELEPHONE (OUTPATIENT)
Dept: INTERNAL MEDICINE CLINIC | Age: 58
End: 2018-07-16

## 2018-07-16 DIAGNOSIS — L98.9 HAND LESION: Primary | ICD-10-CM

## 2018-07-27 ENCOUNTER — ANTI-COAG VISIT (OUTPATIENT)
Dept: CARDIOLOGY CLINIC | Age: 58
End: 2018-07-27

## 2018-07-27 ENCOUNTER — TELEPHONE (OUTPATIENT)
Dept: CARDIOLOGY CLINIC | Age: 58
End: 2018-07-27

## 2018-07-27 LAB — INR BLD: 3.5

## 2018-07-27 NOTE — TELEPHONE ENCOUNTER
LMOVM. Instructions given. 07/27/18: INR is 3.5. Per protocol, no changes. Continue 2 mg daily. Recheck 4 week(s).  Provider notified. ~ Ramo Maldonado RN

## 2018-07-27 NOTE — PROGRESS NOTES
07/27/18: INR is 3.5. Per protocol, no changes. Continue 2 mg daily. Recheck 4 week(s).  Provider notified. ~ Eduardo Lisa RN

## 2018-08-02 ENCOUNTER — TELEPHONE (OUTPATIENT)
Dept: ORTHOPEDIC SURGERY | Age: 58
End: 2018-08-02

## 2018-08-02 NOTE — TELEPHONE ENCOUNTER
INFORMED PATIENT THAT [facility] WANTS HER TO SE HER PCP FOR FURTHER REFILLS ON THE DICLOFENAC D/T HER BEIN ON COUMADIN.     PATIENT VOICED DISCONTENT AT THIS, AND I EXPLAINED THAT THE PCP WOULD NEED TO MONITOR HER FOR BLEEDING SO THEY NEED TO BE THE ONES O GIVE ANY FURTHER SCRIPTS

## 2018-08-06 ENCOUNTER — TELEPHONE (OUTPATIENT)
Dept: CARDIOLOGY CLINIC | Age: 58
End: 2018-08-06

## 2018-08-06 ENCOUNTER — ANTI-COAG VISIT (OUTPATIENT)
Dept: CARDIOLOGY CLINIC | Age: 58
End: 2018-08-06

## 2018-08-06 LAB — INR BLD: 3.5

## 2018-08-06 NOTE — PROGRESS NOTES
08/06/18: INR is 3.5. Per protocol, no changes. Continue 2 mg daily. Recheck 1 week(s).  Provider notified. ~ Karan Torres RN

## 2018-08-14 ENCOUNTER — TELEPHONE (OUTPATIENT)
Dept: CARDIOLOGY CLINIC | Age: 58
End: 2018-08-14

## 2018-08-14 NOTE — TELEPHONE ENCOUNTER
Pt stated her ortho md will not refill her Diclofenac medication due to her being coumadin. Pt started that medication in May, and it didn't affect anything then, so pt is confused why this is an issue now. She stated the medication has been helping her greatly. Please advise. Pt can be reached at [phone].

## 2018-08-14 NOTE — TELEPHONE ENCOUNTER
There is risk of ulcer w/ diclofenac and if develops ulcer, there is risk of bleed on coumadin  Should discuss risk/benefit w/ MD prescribing diclofenac.  Also would have that MD put her on PPI to reduce risk ulcer

## 2018-08-21 LAB — INR BLD: 3.5

## 2018-08-22 ENCOUNTER — TELEPHONE (OUTPATIENT)
Dept: CARDIOLOGY CLINIC | Age: 58
End: 2018-08-22

## 2018-08-22 ENCOUNTER — ANTI-COAG VISIT (OUTPATIENT)
Dept: CARDIOLOGY CLINIC | Age: 58
End: 2018-08-22

## 2018-08-22 NOTE — TELEPHONE ENCOUNTER
Spoke with patient. INR and dosing instructions given. Voiced understanding. 08/21/18: INR is 3.5. Per protocol, no changes. Continue 2 mg daily. Recheck 1 week(s).  Provider notified. ~ Paige Hernandez RN

## 2018-09-17 LAB — INR BLD: 3.5

## 2018-09-18 ENCOUNTER — ANTI-COAG VISIT (OUTPATIENT)
Dept: CARDIOLOGY CLINIC | Age: 58
End: 2018-09-18

## 2018-09-18 ENCOUNTER — TELEPHONE (OUTPATIENT)
Dept: CARDIOLOGY CLINIC | Age: 58
End: 2018-09-18

## 2018-10-05 ENCOUNTER — NURSE ONLY (OUTPATIENT)
Dept: INTERNAL MEDICINE CLINIC | Age: 58
End: 2018-10-05
Payer: COMMERCIAL

## 2018-10-05 DIAGNOSIS — Z23 NEED FOR PROPHYLACTIC VACCINATION AND INOCULATION AGAINST INFLUENZA: Primary | ICD-10-CM

## 2018-10-05 PROCEDURE — 90471 IMMUNIZATION ADMIN: CPT | Performed by: INTERNAL MEDICINE

## 2018-10-05 PROCEDURE — 90686 IIV4 VACC NO PRSV 0.5 ML IM: CPT | Performed by: INTERNAL MEDICINE

## 2018-10-06 LAB — INR BLD: 3.5

## 2018-10-08 ENCOUNTER — TELEPHONE (OUTPATIENT)
Dept: CARDIOLOGY CLINIC | Age: 58
End: 2018-10-08

## 2018-10-08 ENCOUNTER — ANTI-COAG VISIT (OUTPATIENT)
Dept: CARDIOLOGY CLINIC | Age: 58
End: 2018-10-08

## 2018-10-08 NOTE — PROGRESS NOTES
10/06/18: INR is 3.5. Per protocol, no changes. Continue 2 mg daily. Recheck 1 week(s).  Provider notified. ~ Dorian Alvarez RN

## 2018-10-22 LAB — INR BLD: 3.5

## 2018-10-24 ENCOUNTER — ANTI-COAG VISIT (OUTPATIENT)
Dept: CARDIOLOGY CLINIC | Age: 58
End: 2018-10-24

## 2018-10-24 ENCOUNTER — TELEPHONE (OUTPATIENT)
Dept: CARDIOLOGY CLINIC | Age: 58
End: 2018-10-24

## 2018-10-24 NOTE — TELEPHONE ENCOUNTER
10/22/18: INR is 3.5. Per protocol, no changes. Continue 2 mg daily. Recheck 1 week(s).  Provider notified. ~ Ni Araya RN

## 2018-11-09 ENCOUNTER — HOSPITAL ENCOUNTER (OUTPATIENT)
Age: 58
Discharge: HOME | End: 2018-11-09
Payer: COMMERCIAL

## 2018-11-09 DIAGNOSIS — I10 ESSENTIAL HYPERTENSION: ICD-10-CM

## 2018-11-09 DIAGNOSIS — E78.5 HYPERLIPIDEMIA, UNSPECIFIED HYPERLIPIDEMIA TYPE: ICD-10-CM

## 2018-11-09 LAB
A/G RATIO: 1.8 (ref 1.1–2.2)
ALBUMIN SERPL-MCNC: 4.4 G/DL (ref 3.4–5)
ALP BLD-CCNC: 86 U/L (ref 40–129)
ALT SERPL-CCNC: 18 U/L (ref 10–40)
ANION GAP SERPL CALCULATED.3IONS-SCNC: 10 MMOL/L (ref 3–16)
AST SERPL-CCNC: 20 U/L (ref 15–37)
BILIRUB SERPL-MCNC: 0.3 MG/DL (ref 0–1)
BUN BLDV-MCNC: 12 MG/DL (ref 7–20)
CALCIUM SERPL-MCNC: 9.3 MG/DL (ref 8.3–10.6)
CHLORIDE BLD-SCNC: 103 MMOL/L (ref 99–110)
CHOLESTEROL, TOTAL: 142 MG/DL (ref 0–199)
CO2: 28 MMOL/L (ref 21–32)
CREAT SERPL-MCNC: 0.7 MG/DL (ref 0.6–1.1)
GFR AFRICAN AMERICAN: >60
GFR NON-AFRICAN AMERICAN: >60
GLOBULIN: 2.5 G/DL
GLUCOSE BLD-MCNC: 98 MG/DL (ref 70–99)
HDLC SERPL-MCNC: 37 MG/DL (ref 40–60)
LDL CHOLESTEROL CALCULATED: 70 MG/DL
POTASSIUM SERPL-SCNC: 4.1 MMOL/L (ref 3.5–5.1)
SODIUM BLD-SCNC: 141 MMOL/L (ref 136–145)
TOTAL PROTEIN: 6.9 G/DL (ref 6.4–8.2)
TRIGL SERPL-MCNC: 177 MG/DL (ref 0–150)
VLDLC SERPL CALC-MCNC: 35 MG/DL

## 2018-11-09 PROCEDURE — 36415 COLL VENOUS BLD VENIPUNCTURE: CPT

## 2018-11-09 PROCEDURE — 80053 COMPREHEN METABOLIC PANEL: CPT

## 2018-11-09 PROCEDURE — 80061 LIPID PANEL: CPT

## 2018-11-10 LAB — INR BLD: 3.5

## 2018-11-12 ENCOUNTER — ANTI-COAG VISIT (OUTPATIENT)
Dept: CARDIOLOGY CLINIC | Age: 58
End: 2018-11-12

## 2018-11-13 ENCOUNTER — OFFICE VISIT (OUTPATIENT)
Dept: INTERNAL MEDICINE CLINIC | Age: 58
End: 2018-11-13
Payer: COMMERCIAL

## 2018-11-13 VITALS
OXYGEN SATURATION: 95 % | SYSTOLIC BLOOD PRESSURE: 118 MMHG | HEART RATE: 69 BPM | DIASTOLIC BLOOD PRESSURE: 76 MMHG | WEIGHT: 134 LBS | BODY MASS INDEX: 23.74 KG/M2

## 2018-11-13 DIAGNOSIS — I10 ESSENTIAL HYPERTENSION: Primary | ICD-10-CM

## 2018-11-13 DIAGNOSIS — Z72.0 TOBACCO USE: ICD-10-CM

## 2018-11-13 DIAGNOSIS — F32.5 MAJOR DEPRESSIVE DISORDER WITH SINGLE EPISODE, IN FULL REMISSION (HCC): ICD-10-CM

## 2018-11-13 DIAGNOSIS — Z95.2 H/O AORTIC VALVE REPLACEMENT: ICD-10-CM

## 2018-11-13 DIAGNOSIS — E78.5 HYPERLIPIDEMIA, UNSPECIFIED HYPERLIPIDEMIA TYPE: ICD-10-CM

## 2018-11-13 PROCEDURE — 99213 OFFICE O/P EST LOW 20 MIN: CPT | Performed by: INTERNAL MEDICINE

## 2018-11-13 NOTE — PROGRESS NOTES
Subjective:      Patient ID: Nehemias Okeefe is a 66 y.o. female. CC: HTN  HPI: Patient with HTN, tobacco use, IFG, hyperlipidemia, aortic valve replacement history. Reviewed last office visit 5/11/2018 at which time complained of chronic problem with carpal tunnel syndrome bilaterally. She reports previous EMG testing but no procedure. Patient had used wrist braces overnight and states symptoms have improved. Medicines and allergies reviewed  Health maintenance reviewed  Family history reviewed. Social history reviewed  Review laboratory data 11/9/2018: Chemistry panel: Unremarkable with glucose: 98. Lipid panel: Total cholesterol: 142. LDL cholesterol: 70. Triglycerides: 177. Review of Systems    Review of Systems   Constitutional: negative   HENT: negative   EYES: negative   Respiratory: negative   Gastrointestinal: negative   Endocrine: negative   Musculoskeletal: negative   Skin: negative   Allergic/Immunological: negative   Hematological: negative   Psychiatric/Behavorial: negative   CV: negative   CNS: negative   :Negative   S/E:Negative  Renal: Negative      Objective:   Physical Exam : Lungs: Clear to auscultation. No wheezing. CV: S1-S2 normal.  LAVINIA with radiation into carotids bilaterally. Carotid good upstroke. Head/neck: Neck: No lymphadenopathy. Thyroid: Nonpalpable and nontender to touch. Eyes: EOMI, PERRLA without nystagmus. Spine/extremities: No ankle edema. Skin: No rash. Blood pressure 118/76, pulse 69, weight 134 lb (60.8 kg), SpO2 95 %, not currently breastfeeding. Assessment:      1. HTN: Stable  2. Lipids: Borderline elevated triglycerides  3. Glucose: 98: Much improved  4. Carpal tunnel syndrome symptoms have improved with use of braces  5. Tobacco use: Unfortunately continues  6. AVR         Plan:      1. Continue present medicines  2. Call if carpal tunnel syndrome becomes symptomatic  3.   Gave slip to obtain fasting laboratory studies before next

## 2018-12-03 ENCOUNTER — TELEPHONE (OUTPATIENT)
Dept: INTERNAL MEDICINE CLINIC | Age: 58
End: 2018-12-03

## 2018-12-03 DIAGNOSIS — Z79.01 ANTICOAGULANT LONG-TERM USE: Primary | ICD-10-CM

## 2018-12-03 RX ORDER — ASPIRIN 325 MG
TABLET, DELAYED RELEASE (ENTERIC COATED) ORAL
Qty: 90 TABLET | Refills: 3 | Status: SHIPPED | OUTPATIENT
Start: 2018-12-03 | End: 2020-01-01

## 2018-12-08 LAB — INR BLD: 3.5

## 2018-12-10 ENCOUNTER — ANTI-COAG VISIT (OUTPATIENT)
Dept: CARDIOLOGY CLINIC | Age: 58
End: 2018-12-10

## 2018-12-10 ENCOUNTER — TELEPHONE (OUTPATIENT)
Dept: CARDIOLOGY CLINIC | Age: 58
End: 2018-12-10

## 2018-12-13 RX ORDER — BUSPIRONE HYDROCHLORIDE 10 MG/1
TABLET ORAL
Qty: 60 TABLET | Refills: 3 | Status: SHIPPED | OUTPATIENT
Start: 2018-12-13 | End: 2018-12-27 | Stop reason: SDUPTHER

## 2018-12-27 ENCOUNTER — TELEPHONE (OUTPATIENT)
Dept: INTERNAL MEDICINE CLINIC | Age: 58
End: 2018-12-27

## 2018-12-27 DIAGNOSIS — F41.9 ANXIETY: Primary | ICD-10-CM

## 2018-12-27 LAB — INR BLD: 3.5

## 2018-12-27 RX ORDER — BUSPIRONE HYDROCHLORIDE 5 MG/1
TABLET ORAL
Qty: 60 TABLET | Refills: 3 | Status: SHIPPED | OUTPATIENT
Start: 2018-12-27 | End: 2019-01-22

## 2018-12-27 NOTE — TELEPHONE ENCOUNTER
buspar 10mg on backorder    Refill request for buspar 5mg medication.      Name of Pharmacy- [facility]      Last visit - 11/13/18     Pending visit - 5/14/19    Last refill -12/13/18      Medication Contract signed -   Last Oarrs ran-         Additional Comments

## 2018-12-28 ENCOUNTER — ANTI-COAG VISIT (OUTPATIENT)
Dept: CARDIOLOGY CLINIC | Age: 58
End: 2018-12-28

## 2018-12-28 ENCOUNTER — TELEPHONE (OUTPATIENT)
Dept: CARDIOLOGY CLINIC | Age: 58
End: 2018-12-28

## 2018-12-28 NOTE — PROGRESS NOTES
12/27/18: INR is 3.5. Per protocol, no changes. Continue 2 mg daily. Recheck 2 week(s).  Provider notified. ~ Krissy Lorenzana RN

## 2019-01-01 ENCOUNTER — TELEPHONE (OUTPATIENT)
Dept: CARDIOLOGY CLINIC | Age: 59
End: 2019-01-01

## 2019-01-01 ENCOUNTER — TELEPHONE (OUTPATIENT)
Dept: ORTHOPEDIC SURGERY | Age: 59
End: 2019-01-01

## 2019-01-01 ENCOUNTER — ANTI-COAG VISIT (OUTPATIENT)
Dept: CARDIOLOGY CLINIC | Age: 59
End: 2019-01-01

## 2019-01-01 ENCOUNTER — HOSPITAL ENCOUNTER (OUTPATIENT)
Age: 59
Discharge: HOME | End: 2019-11-14
Payer: COMMERCIAL

## 2019-01-01 ENCOUNTER — OFFICE VISIT (OUTPATIENT)
Dept: INTERNAL MEDICINE CLINIC | Age: 59
End: 2019-01-01
Payer: COMMERCIAL

## 2019-01-01 ENCOUNTER — OFFICE VISIT (OUTPATIENT)
Dept: ORTHOPEDIC SURGERY | Age: 59
End: 2019-01-01
Payer: COMMERCIAL

## 2019-01-01 ENCOUNTER — TELEPHONE (OUTPATIENT)
Dept: INTERNAL MEDICINE CLINIC | Age: 59
End: 2019-01-01

## 2019-01-01 VITALS — HEIGHT: 63 IN | WEIGHT: 132.06 LBS | BODY MASS INDEX: 23.4 KG/M2

## 2019-01-01 VITALS
DIASTOLIC BLOOD PRESSURE: 78 MMHG | HEART RATE: 55 BPM | OXYGEN SATURATION: 98 % | BODY MASS INDEX: 23.38 KG/M2 | WEIGHT: 132 LBS | SYSTOLIC BLOOD PRESSURE: 118 MMHG

## 2019-01-01 DIAGNOSIS — I10 ESSENTIAL HYPERTENSION: Primary | ICD-10-CM

## 2019-01-01 DIAGNOSIS — M54.6 THORACIC SPINE PAIN: Primary | ICD-10-CM

## 2019-01-01 DIAGNOSIS — Z11.59 SCREENING FOR VIRAL DISEASE: ICD-10-CM

## 2019-01-01 DIAGNOSIS — G89.29 CHRONIC MIDLINE THORACIC BACK PAIN: ICD-10-CM

## 2019-01-01 DIAGNOSIS — M54.50 ACUTE LOW BACK PAIN WITHOUT SCIATICA, UNSPECIFIED BACK PAIN LATERALITY: ICD-10-CM

## 2019-01-01 DIAGNOSIS — M54.6 CHRONIC MIDLINE THORACIC BACK PAIN: ICD-10-CM

## 2019-01-01 DIAGNOSIS — F41.9 ANXIETY: ICD-10-CM

## 2019-01-01 DIAGNOSIS — M54.6 ACUTE THORACIC BACK PAIN, UNSPECIFIED BACK PAIN LATERALITY: ICD-10-CM

## 2019-01-01 DIAGNOSIS — Z79.01 ANTICOAGULANT LONG-TERM USE: ICD-10-CM

## 2019-01-01 DIAGNOSIS — Z23 NEED FOR PROPHYLACTIC VACCINATION WITH TETANUS-DIPHTHERIA (TD): ICD-10-CM

## 2019-01-01 DIAGNOSIS — Z95.2 H/O AORTIC VALVE REPLACEMENT: ICD-10-CM

## 2019-01-01 DIAGNOSIS — E78.5 HYPERLIPIDEMIA, UNSPECIFIED HYPERLIPIDEMIA TYPE: ICD-10-CM

## 2019-01-01 DIAGNOSIS — Z72.0 TOBACCO USE: ICD-10-CM

## 2019-01-01 DIAGNOSIS — I10 ESSENTIAL HYPERTENSION: ICD-10-CM

## 2019-01-01 LAB
A/G RATIO: 1.6 (ref 1.1–2.2)
ALBUMIN SERPL-MCNC: 4.2 G/DL (ref 3.4–5)
ALP BLD-CCNC: 74 U/L (ref 40–129)
ALT SERPL-CCNC: 16 U/L (ref 10–40)
ANION GAP SERPL CALCULATED.3IONS-SCNC: 13 MMOL/L (ref 3–16)
AST SERPL-CCNC: 19 U/L (ref 15–37)
BILIRUB SERPL-MCNC: 0.3 MG/DL (ref 0–1)
BUN BLDV-MCNC: 13 MG/DL (ref 7–20)
CALCIUM SERPL-MCNC: 9.3 MG/DL (ref 8.3–10.6)
CHLORIDE BLD-SCNC: 100 MMOL/L (ref 99–110)
CHOLESTEROL, TOTAL: 148 MG/DL (ref 0–199)
CO2: 28 MMOL/L (ref 21–32)
CREAT SERPL-MCNC: 0.7 MG/DL (ref 0.6–1.1)
GFR AFRICAN AMERICAN: >60
GFR NON-AFRICAN AMERICAN: >60
GLOBULIN: 2.6 G/DL
GLUCOSE BLD-MCNC: 94 MG/DL (ref 70–99)
HCT VFR BLD CALC: 43.7 % (ref 36–48)
HDLC SERPL-MCNC: 48 MG/DL (ref 40–60)
HEMOGLOBIN: 14.4 G/DL (ref 12–16)
INR BLD: 1.8
INR BLD: 3.5
LDL CHOLESTEROL CALCULATED: 69 MG/DL
MCH RBC QN AUTO: 27.4 PG (ref 26–34)
MCHC RBC AUTO-ENTMCNC: 33 G/DL (ref 31–36)
MCV RBC AUTO: 82.9 FL (ref 80–100)
PDW BLD-RTO: 15.3 % (ref 12.4–15.4)
PLATELET # BLD: 142 K/UL (ref 135–450)
PMV BLD AUTO: 11.1 FL (ref 5–10.5)
POTASSIUM SERPL-SCNC: 3.4 MMOL/L (ref 3.5–5.1)
RBC # BLD: 5.27 M/UL (ref 4–5.2)
SODIUM BLD-SCNC: 141 MMOL/L (ref 136–145)
TOTAL PROTEIN: 6.8 G/DL (ref 6.4–8.2)
TRIGL SERPL-MCNC: 155 MG/DL (ref 0–150)
VLDLC SERPL CALC-MCNC: 31 MG/DL
WBC # BLD: 7.2 K/UL (ref 4–11)

## 2019-01-01 PROCEDURE — 80061 LIPID PANEL: CPT

## 2019-01-01 PROCEDURE — 99214 OFFICE O/P EST MOD 30 MIN: CPT | Performed by: INTERNAL MEDICINE

## 2019-01-01 PROCEDURE — 36415 COLL VENOUS BLD VENIPUNCTURE: CPT

## 2019-01-01 PROCEDURE — 90472 IMMUNIZATION ADMIN EACH ADD: CPT | Performed by: INTERNAL MEDICINE

## 2019-01-01 PROCEDURE — 99214 OFFICE O/P EST MOD 30 MIN: CPT | Performed by: PHYSICAL MEDICINE & REHABILITATION

## 2019-01-01 PROCEDURE — 80053 COMPREHEN METABOLIC PANEL: CPT

## 2019-01-01 PROCEDURE — 90714 TD VACC NO PRESV 7 YRS+ IM: CPT | Performed by: INTERNAL MEDICINE

## 2019-01-01 PROCEDURE — 90471 IMMUNIZATION ADMIN: CPT | Performed by: INTERNAL MEDICINE

## 2019-01-01 PROCEDURE — 85027 COMPLETE CBC AUTOMATED: CPT

## 2019-01-01 PROCEDURE — 90686 IIV4 VACC NO PRSV 0.5 ML IM: CPT | Performed by: INTERNAL MEDICINE

## 2019-01-01 ASSESSMENT — PATIENT HEALTH QUESTIONNAIRE - PHQ9
SUM OF ALL RESPONSES TO PHQ QUESTIONS 1-9: 0
SUM OF ALL RESPONSES TO PHQ9 QUESTIONS 1 & 2: 0
2. FEELING DOWN, DEPRESSED OR HOPELESS: 0
1. LITTLE INTEREST OR PLEASURE IN DOING THINGS: 0
SUM OF ALL RESPONSES TO PHQ QUESTIONS 1-9: 0

## 2019-01-18 LAB — INR BLD: 3.5

## 2019-01-21 ENCOUNTER — ANTI-COAG VISIT (OUTPATIENT)
Dept: CARDIOLOGY CLINIC | Age: 59
End: 2019-01-21

## 2019-01-21 ENCOUNTER — TELEPHONE (OUTPATIENT)
Dept: CARDIOLOGY CLINIC | Age: 59
End: 2019-01-21

## 2019-01-22 DIAGNOSIS — F41.9 ANXIETY: Primary | ICD-10-CM

## 2019-01-22 RX ORDER — BUSPIRONE HYDROCHLORIDE 10 MG/1
TABLET ORAL
Qty: 45 TABLET | Refills: 9 | Status: SHIPPED | OUTPATIENT
Start: 2019-01-22 | End: 2020-01-01

## 2019-02-02 LAB — INR BLD: 3.5

## 2019-02-04 ENCOUNTER — TELEPHONE (OUTPATIENT)
Dept: CARDIOLOGY CLINIC | Age: 59
End: 2019-02-04

## 2019-02-04 ENCOUNTER — ANTI-COAG VISIT (OUTPATIENT)
Dept: CARDIOLOGY CLINIC | Age: 59
End: 2019-02-04

## 2019-02-14 ENCOUNTER — OFFICE VISIT (OUTPATIENT)
Dept: CARDIOLOGY CLINIC | Age: 59
End: 2019-02-14
Payer: COMMERCIAL

## 2019-02-14 VITALS
DIASTOLIC BLOOD PRESSURE: 80 MMHG | HEART RATE: 51 BPM | OXYGEN SATURATION: 98 % | WEIGHT: 130.8 LBS | HEIGHT: 63 IN | BODY MASS INDEX: 23.18 KG/M2 | SYSTOLIC BLOOD PRESSURE: 120 MMHG

## 2019-02-14 DIAGNOSIS — E78.5 HYPERLIPIDEMIA, UNSPECIFIED HYPERLIPIDEMIA TYPE: ICD-10-CM

## 2019-02-14 DIAGNOSIS — R00.2 PALPITATIONS: ICD-10-CM

## 2019-02-14 DIAGNOSIS — I10 ESSENTIAL HYPERTENSION: ICD-10-CM

## 2019-02-14 DIAGNOSIS — Z95.2 H/O AORTIC VALVE REPLACEMENT: Primary | ICD-10-CM

## 2019-02-14 PROCEDURE — 99214 OFFICE O/P EST MOD 30 MIN: CPT | Performed by: INTERNAL MEDICINE

## 2019-02-22 ENCOUNTER — ANTI-COAG VISIT (OUTPATIENT)
Dept: CARDIOLOGY CLINIC | Age: 59
End: 2019-02-22

## 2019-02-22 ENCOUNTER — TELEPHONE (OUTPATIENT)
Dept: CARDIOLOGY CLINIC | Age: 59
End: 2019-02-22

## 2019-02-22 LAB — INR BLD: 3.5

## 2019-03-07 ENCOUNTER — HOSPITAL ENCOUNTER (OUTPATIENT)
Dept: CARDIOLOGY | Age: 59
Discharge: HOME | End: 2019-03-07
Payer: COMMERCIAL

## 2019-03-07 DIAGNOSIS — Z95.2 H/O AORTIC VALVE REPLACEMENT: ICD-10-CM

## 2019-03-07 DIAGNOSIS — R00.2 PALPITATIONS: ICD-10-CM

## 2019-03-07 LAB
LV EF: 55 %
LVEF MODALITY: NORMAL

## 2019-03-07 PROCEDURE — 93306 TTE W/DOPPLER COMPLETE: CPT

## 2019-03-12 ENCOUNTER — ANTI-COAG VISIT (OUTPATIENT)
Dept: CARDIOLOGY CLINIC | Age: 59
End: 2019-03-12

## 2019-03-12 ENCOUNTER — TELEPHONE (OUTPATIENT)
Dept: CARDIOLOGY CLINIC | Age: 59
End: 2019-03-12

## 2019-03-12 LAB — INR BLD: 3.5

## 2019-03-14 RX ORDER — SIMVASTATIN 40 MG
TABLET ORAL
Qty: 90 TABLET | Refills: 1 | Status: SHIPPED | OUTPATIENT
Start: 2019-03-14 | End: 2019-09-11 | Stop reason: SDUPTHER

## 2019-03-14 RX ORDER — GABAPENTIN 300 MG/1
CAPSULE ORAL
Qty: 180 CAPSULE | Refills: 0 | Status: SHIPPED | OUTPATIENT
Start: 2019-03-14 | End: 2019-06-04 | Stop reason: SDUPTHER

## 2019-03-15 ENCOUNTER — HOSPITAL ENCOUNTER (OUTPATIENT)
Age: 59
Discharge: HOME | End: 2019-03-15
Payer: COMMERCIAL

## 2019-03-15 LAB
ALBUMIN SERPL-MCNC: 4.5 G/DL (ref 3.4–5)
ALP BLD-CCNC: 88 U/L (ref 40–129)
ALT SERPL-CCNC: 19 U/L (ref 10–40)
AST SERPL-CCNC: 20 U/L (ref 15–37)
BASOPHILS ABSOLUTE: 0.1 K/UL (ref 0–0.2)
BASOPHILS RELATIVE PERCENT: 1.2 %
BILIRUB SERPL-MCNC: 0.4 MG/DL (ref 0–1)
BILIRUBIN DIRECT: <0.2 MG/DL (ref 0–0.3)
BILIRUBIN, INDIRECT: NORMAL MG/DL (ref 0–1)
EOSINOPHILS ABSOLUTE: 0.3 K/UL (ref 0–0.6)
EOSINOPHILS RELATIVE PERCENT: 3.6 %
HCT VFR BLD CALC: 44.5 % (ref 36–48)
HEMOGLOBIN: 14.6 G/DL (ref 12–16)
LYMPHOCYTES ABSOLUTE: 1.9 K/UL (ref 1–5.1)
LYMPHOCYTES RELATIVE PERCENT: 22.2 %
MCH RBC QN AUTO: 26.9 PG (ref 26–34)
MCHC RBC AUTO-ENTMCNC: 32.9 G/DL (ref 31–36)
MCV RBC AUTO: 81.7 FL (ref 80–100)
MONOCYTES ABSOLUTE: 0.6 K/UL (ref 0–1.3)
MONOCYTES RELATIVE PERCENT: 7 %
NEUTROPHILS ABSOLUTE: 5.6 K/UL (ref 1.7–7.7)
NEUTROPHILS RELATIVE PERCENT: 66 %
PDW BLD-RTO: 15.8 % (ref 12.4–15.4)
PLATELET # BLD: 185 K/UL (ref 135–450)
PMV BLD AUTO: 11.3 FL (ref 5–10.5)
RBC # BLD: 5.45 M/UL (ref 4–5.2)
TOTAL PROTEIN: 7.2 G/DL (ref 6.4–8.2)
WBC # BLD: 8.5 K/UL (ref 4–11)

## 2019-03-15 PROCEDURE — 36415 COLL VENOUS BLD VENIPUNCTURE: CPT

## 2019-03-15 PROCEDURE — 85025 COMPLETE CBC W/AUTO DIFF WBC: CPT

## 2019-03-15 PROCEDURE — 80076 HEPATIC FUNCTION PANEL: CPT

## 2019-03-27 LAB — INR BLD: 3.5

## 2019-03-29 ENCOUNTER — ANTI-COAG VISIT (OUTPATIENT)
Dept: CARDIOLOGY CLINIC | Age: 59
End: 2019-03-29

## 2019-04-10 LAB — INR BLD: 3.5

## 2019-04-10 RX ORDER — HYDROCHLOROTHIAZIDE 25 MG/1
TABLET ORAL
Qty: 90 TABLET | Refills: 3 | Status: SHIPPED | OUTPATIENT
Start: 2019-04-10 | End: 2020-01-01

## 2019-04-10 NOTE — TELEPHONE ENCOUNTER
MG Pt  Last office visit 2/14/19  Plan:  30 day cardiac event monitor   Echocardiogram   Continue current medications   Smoking cessation discussed   Continue to monitor blood pressure routinely  Follow up with me in 1 year.

## 2019-04-11 ENCOUNTER — ANTI-COAG VISIT (OUTPATIENT)
Dept: CARDIOLOGY CLINIC | Age: 59
End: 2019-04-11

## 2019-04-26 ENCOUNTER — ANTI-COAG VISIT (OUTPATIENT)
Dept: CARDIOLOGY CLINIC | Age: 59
End: 2019-04-26

## 2019-04-26 LAB — INR BLD: 3.5

## 2019-05-09 ENCOUNTER — HOSPITAL ENCOUNTER (OUTPATIENT)
Age: 59
Discharge: HOME | End: 2019-05-09
Payer: COMMERCIAL

## 2019-05-09 DIAGNOSIS — I10 ESSENTIAL HYPERTENSION: ICD-10-CM

## 2019-05-09 DIAGNOSIS — E78.5 HYPERLIPIDEMIA, UNSPECIFIED HYPERLIPIDEMIA TYPE: ICD-10-CM

## 2019-05-09 LAB
A/G RATIO: 1.4 (ref 1.1–2.2)
ALBUMIN SERPL-MCNC: 3.9 G/DL (ref 3.4–5)
ALP BLD-CCNC: 79 U/L (ref 40–129)
ALT SERPL-CCNC: 13 U/L (ref 10–40)
ANION GAP SERPL CALCULATED.3IONS-SCNC: 9 MMOL/L (ref 3–16)
AST SERPL-CCNC: 17 U/L (ref 15–37)
BILIRUB SERPL-MCNC: 0.3 MG/DL (ref 0–1)
BUN BLDV-MCNC: 9 MG/DL (ref 7–20)
CALCIUM SERPL-MCNC: 8.7 MG/DL (ref 8.3–10.6)
CHLORIDE BLD-SCNC: 101 MMOL/L (ref 99–110)
CHOLESTEROL, TOTAL: 151 MG/DL (ref 0–199)
CO2: 29 MMOL/L (ref 21–32)
CREAT SERPL-MCNC: 0.7 MG/DL (ref 0.6–1.1)
GFR AFRICAN AMERICAN: >60
GFR NON-AFRICAN AMERICAN: >60
GLOBULIN: 2.8 G/DL
GLUCOSE BLD-MCNC: 98 MG/DL (ref 70–99)
HDLC SERPL-MCNC: 40 MG/DL (ref 40–60)
LDL CHOLESTEROL CALCULATED: 84 MG/DL
POTASSIUM SERPL-SCNC: 3.3 MMOL/L (ref 3.5–5.1)
SODIUM BLD-SCNC: 139 MMOL/L (ref 136–145)
TOTAL PROTEIN: 6.7 G/DL (ref 6.4–8.2)
TRIGL SERPL-MCNC: 135 MG/DL (ref 0–150)
VLDLC SERPL CALC-MCNC: 27 MG/DL

## 2019-05-09 PROCEDURE — 80053 COMPREHEN METABOLIC PANEL: CPT

## 2019-05-09 PROCEDURE — 36415 COLL VENOUS BLD VENIPUNCTURE: CPT

## 2019-05-09 PROCEDURE — 80061 LIPID PANEL: CPT

## 2019-05-10 ENCOUNTER — ANTI-COAG VISIT (OUTPATIENT)
Dept: CARDIOLOGY CLINIC | Age: 59
End: 2019-05-10

## 2019-05-10 LAB — INR BLD: 3.5

## 2019-05-15 ENCOUNTER — OFFICE VISIT (OUTPATIENT)
Dept: INTERNAL MEDICINE CLINIC | Age: 59
End: 2019-05-15
Payer: COMMERCIAL

## 2019-05-15 VITALS
HEART RATE: 61 BPM | WEIGHT: 130 LBS | DIASTOLIC BLOOD PRESSURE: 76 MMHG | BODY MASS INDEX: 23.03 KG/M2 | OXYGEN SATURATION: 99 % | SYSTOLIC BLOOD PRESSURE: 132 MMHG

## 2019-05-15 DIAGNOSIS — H61.22 IMPACTED CERUMEN OF LEFT EAR: ICD-10-CM

## 2019-05-15 DIAGNOSIS — E78.5 HYPERLIPIDEMIA, UNSPECIFIED HYPERLIPIDEMIA TYPE: ICD-10-CM

## 2019-05-15 DIAGNOSIS — I10 ESSENTIAL HYPERTENSION: Primary | ICD-10-CM

## 2019-05-15 DIAGNOSIS — E87.6 HYPOKALEMIA: ICD-10-CM

## 2019-05-15 PROCEDURE — 99214 OFFICE O/P EST MOD 30 MIN: CPT | Performed by: INTERNAL MEDICINE

## 2019-05-15 ASSESSMENT — ENCOUNTER SYMPTOMS
CHEST TIGHTNESS: 0
SHORTNESS OF BREATH: 0
ABDOMINAL DISTENTION: 0
NAUSEA: 0
VOMITING: 0
CONSTIPATION: 0
WHEEZING: 0
COUGH: 0
DIARRHEA: 0
BACK PAIN: 0

## 2019-05-15 NOTE — PROGRESS NOTES
5/15/19    Luis Page  1960      Chief Complaint   Patient presents with    Hypertension    Hyperlipidemia       HPI    Here for f/u htn, hyperlipidemia. Patient feels well. Labs reviewed with pt.     htn has improved since starting hctz 25mg and metoprolol 25mg. Denies cp/sob/pnd/orthopnea. Watches her sodium intake. Does not exercise but has lost weight since she retired. Lipids improved on simvastatin 40mg    C/o decreased hearing left ear x 1-2 months. No pain, congestion, drainage, tinnitus, vertigo    Review of Systems   Constitutional: Negative for unexpected weight change. HENT: Positive for hearing loss. Respiratory: Negative for cough, chest tightness, shortness of breath and wheezing. Cardiovascular: Negative for chest pain, palpitations and leg swelling. Gastrointestinal: Negative for abdominal distention, constipation, diarrhea, nausea and vomiting. Musculoskeletal: Negative for arthralgias, back pain and myalgias. Neurological: Negative for tremors and numbness. All other systems reviewed and are negative.       Current Outpatient Medications   Medication Sig Dispense Refill    hydrochlorothiazide (HYDRODIURIL) 25 MG tablet TAKE 1 TABLET BY MOUTH DAILY 90 tablet 3    gabapentin (NEURONTIN) 300 MG capsule TAKE ONE CAPSULE BY MOUTH TWICE A DAY FOR NEUROPATHY 180 capsule 0    simvastatin (ZOCOR) 40 MG tablet TAKE 1 TABLET BY MOUTH EVERY DAY AT 9PM FOR HIGH CHOLESTEROL 90 tablet 1    busPIRone (BUSPAR) 10 MG tablet TAKE ONE-HALF TABLET BY MOUTH TWICE A DAY FOR ANXIETY 45 tablet 9    metoprolol tartrate (LOPRESSOR) 25 MG tablet TAKE 1/2 TABLET BY MOUTH TWICE A DAY FOR HIGH BLOOD PRESSURE 60 tablet 4    aspirin 325 MG EC tablet TAKE 1 TABLET BY MOUTH DAILY 90 tablet 3    warfarin (COUMADIN) 2 MG tablet TAKE 1 TABLET BY MOUTH DAILY 90 tablet 3    KLOR-CON M10 10 MEQ extended release tablet TAKE 1 TABLET BY MOUTH DAILY 180 tablet 2    diclofenac (VOLTAREN) 75 MG EC tablet I po BID PRN 60 tablet 1    methocarbamol (ROBAXIN-750) 750 MG tablet I po BID PRN 60 tablet 1    meloxicam (MOBIC) 15 MG tablet Take 1 tablet by mouth daily 30 tablet 1    warfarin (COUMADIN) 2 MG tablet TAKE 1 TABLET BY MOUTH DAILY 90 tablet 1    MINIVELLE 0.05 MG/24HR   11    fluticasone (FLONASE) 50 MCG/ACT nasal spray 2 sprays by Nasal route daily. 1 Bottle 0    meclizine (ANTIVERT) 25 MG tablet Take 1 tablet by mouth 3 times daily as needed for Dizziness. 20 tablet 0    albuterol (PROVENTIL HFA;VENTOLIN HFA) 108 (90 BASE) MCG/ACT inhaler Inhale 2 puffs into the lungs every 6 hours as needed for Wheezing. 1 Inhaler 3     No current facility-administered medications for this visit. Physical Exam   Constitutional: She is oriented to person, place, and time. She appears well-developed and well-nourished. No distress. HENT:   Right Ear: External ear normal.   Mouth/Throat: Oropharynx is clear and moist. No oropharyngeal exudate. Left cerumen impaction   Neck: Neck supple. No thyromegaly present. Cardiovascular: Normal rate and regular rhythm. Exam reveals no gallop and no friction rub. Murmur heard. Crisp mechanical heart sounds   Pulmonary/Chest: Effort normal and breath sounds normal. No respiratory distress. She has no wheezes. She has no rales. Abdominal: Soft. She exhibits no distension. There is no tenderness. There is no rebound and no guarding. Musculoskeletal: She exhibits no edema. Neurological: She is alert and oriented to person, place, and time. Skin: She is not diaphoretic. Vitals:    05/15/19 0817   BP: 132/76   Pulse: 61   SpO2: 99%         ASSESSMENT/PLAN:  1. Essential hypertension  Stable. Continue to monitor      2. Hyperlipidemia, unspecified hyperlipidemia type  Controlled. Continue to monitor      3. Hypokalemia  Asymptomatic. Continue potassium supplement. Start eating banana daily. Recheck one week  - POTASSIUM; Future    4.  Impacted cerumen of

## 2019-05-21 ENCOUNTER — HOSPITAL ENCOUNTER (OUTPATIENT)
Age: 59
Discharge: HOME | End: 2019-05-21
Payer: COMMERCIAL

## 2019-05-21 ENCOUNTER — OFFICE VISIT (OUTPATIENT)
Dept: INTERNAL MEDICINE CLINIC | Age: 59
End: 2019-05-21
Payer: COMMERCIAL

## 2019-05-21 VITALS — DIASTOLIC BLOOD PRESSURE: 70 MMHG | OXYGEN SATURATION: 97 % | HEART RATE: 55 BPM | SYSTOLIC BLOOD PRESSURE: 134 MMHG

## 2019-05-21 DIAGNOSIS — E87.6 HYPOKALEMIA: ICD-10-CM

## 2019-05-21 DIAGNOSIS — H61.22 IMPACTED CERUMEN, LEFT EAR: Primary | ICD-10-CM

## 2019-05-21 LAB — POTASSIUM SERPL-SCNC: 3.4 MMOL/L (ref 3.5–5.1)

## 2019-05-21 PROCEDURE — 36415 COLL VENOUS BLD VENIPUNCTURE: CPT

## 2019-05-21 PROCEDURE — 84132 ASSAY OF SERUM POTASSIUM: CPT

## 2019-05-21 PROCEDURE — 69209 REMOVE IMPACTED EAR WAX UNI: CPT | Performed by: NURSE PRACTITIONER

## 2019-05-21 ASSESSMENT — ENCOUNTER SYMPTOMS
DIARRHEA: 0
ABDOMINAL PAIN: 0
SHORTNESS OF BREATH: 0
CHEST TIGHTNESS: 0
CONSTIPATION: 0
COUGH: 0
NAUSEA: 0
ALLERGIC/IMMUNOLOGIC NEGATIVE: 1
VOMITING: 0

## 2019-05-21 NOTE — PROGRESS NOTES
SUBJECTIVE:  Angela Darby a 64 y. o.female    Chief Complaint   Patient presents with    Ear Fullness       Patient is here for ear irrigation of left ear for cerumen impactation. She has for one week placed debrox in left ear to soften wax. Denies fever/chills. No ear pain. Just left ear fullness and trouble hearing. No shortness of breath. No chest pain. No dizziness. No syncope. No numbness/tingling. No issues with bowel or bladder. No hematuria. No melena. Past Medical History:   Diagnosis Date    Anxiety     Anxiety     Aortic valve defect 2008    congenital,Ao replaced,Coronary angiogram= Normal    Depression     HTN (hypertension)     Hyperlipidemia     Hyperlipidemia     Hypertension     LONG TERM ANTICOAGULENT USE     Palpitations     Pneumonia     Psoriasis        Past Surgical History:   Procedure Laterality Date    AORTIC VALVE REPLACEMENT       4 years ago    AORTIC VALVE REPLACEMENT      CARDIAC VALVE REPLACEMENT  9/25/2008    CERVICAL DISCECTOMY  2005    CERVICAL FUSION  2006    COLONOSCOPY  5/8/14    Tubular Adenoma. Internal Hemorrhoids. Redo 5 years.     HERNIA REPAIR      HYSTERECTOMY  1998    BSO    HYSTERECTOMY, TOTAL ABDOMINAL      KNEE ARTHROSCOPY  9/25/12    right partial medial menisectomy    LAPAROSCOPY  1997 & 1994    endometriasis    NASAL SEPTUM SURGERY  2005    NASAL SEPTUM SURGERY      VASCULAR SURGERY      descending artery repair       Family History   Problem Relation Age of Onset    Stroke Mother     Pacemaker Mother     Stroke Father     Heart Disease Father 61        MI    High Blood Pressure Father        Social History     Socioeconomic History    Marital status:      Spouse name: Not on file    Number of children: Not on file    Years of education: Not on file    Highest education level: Not on file   Occupational History    Not on file   Social Needs    Financial resource strain: Not on file    Food insecurity: Worry: Not on file     Inability: Not on file    Transportation needs:     Medical: Not on file     Non-medical: Not on file   Tobacco Use    Smoking status: Current Every Day Smoker     Packs/day: 1.00     Years: 20.00     Pack years: 20.00    Smokeless tobacco: Never Used   Substance and Sexual Activity    Alcohol use: Yes     Comment: 2 x months    Drug use: No    Sexual activity: Yes     Partners: Male   Lifestyle    Physical activity:     Days per week: Not on file     Minutes per session: Not on file    Stress: Not on file   Relationships    Social connections:     Talks on phone: Not on file     Gets together: Not on file     Attends [employer] service: Not on file     Active member of club or organization: Not on file     Attends meetings of clubs or organizations: Not on file     Relationship status: Not on file    Intimate partner violence:     Fear of current or ex partner: Not on file     Emotionally abused: Not on file     Physically abused: Not on file     Forced sexual activity: Not on file   Other Topics Concern    Not on file   Social History Narrative    Not on file       Review of Systems   Constitutional: Negative for chills and fever. HENT: Positive for hearing loss (left ear wax impacted). Respiratory: Negative for cough, chest tightness and shortness of breath. Cardiovascular: Negative for chest pain. Gastrointestinal: Negative for abdominal pain, constipation, diarrhea, nausea and vomiting. Endocrine: Negative. Genitourinary: Negative. Musculoskeletal: Negative. Skin: Negative. Allergic/Immunologic: Negative. Neurological: Negative for dizziness, syncope and light-headedness. Hematological: Negative. Psychiatric/Behavioral: Negative. OBJECTIVE:  /70 (Site: Right Upper Arm, Position: Sitting, Cuff Size: Medium Adult)   Pulse 55   SpO2 97%     Physical Exam   Constitutional: She is oriented to person, place, and time.  She appears well-developed and well-nourished. HENT:   Head: Normocephalic and atraumatic. Right Ear: Hearing and tympanic membrane normal.   Left Ear: Decreased hearing is noted. Mouth/Throat: Oropharynx is clear and moist.   Left ear with cerumen impacted. Eyes: Pupils are equal, round, and reactive to light. Conjunctivae are normal. No scleral icterus. Neck: Normal range of motion. Neck supple. Cardiovascular: Normal rate, regular rhythm and normal heart sounds. Pulmonary/Chest: Effort normal and breath sounds normal. No respiratory distress. Abdominal: Soft. Normal appearance and bowel sounds are normal. There is no hepatosplenomegaly. There is no CVA tenderness. Musculoskeletal: Normal range of motion. Neurological: She is alert and oriented to person, place, and time. Skin: Skin is warm, dry and intact. Psychiatric: She has a normal mood and affect. Her speech is normal and behavior is normal. Judgment and thought content normal.   Vitals reviewed. ASSESSMENT/PLAN:    1. Impacted cerumen, left ear  Left Ear was irrigated with luke warm water and hydrogen peroxide. moderate amount of yellow cerumen irrigated. TM:left-  normal and light reflex normal. Patient tolerated procedure well. Return if symptoms worsen or fail to improve, for as scheduled with Dr. Michelle Herman.

## 2019-05-21 NOTE — PATIENT INSTRUCTIONS
Patient Education        Earwax Blockage: Care Instructions  Your Care Instructions    Earwax is a natural substance that protects the ear canal. Normally, earwax drains from the ears and does not cause problems. Sometimes earwax builds up and hardens. Earwax blockage (also called cerumen impaction) can cause some loss of hearing and pain. When wax is tightly packed, you will need to have your doctor remove it. Follow-up care is a key part of your treatment and safety. Be sure to make and go to all appointments, and call your doctor if you are having problems. It's also a good idea to know your test results and keep a list of the medicines you take. How can you care for yourself at home? · Do not try to remove earwax with cotton swabs, fingers, or other objects. This can make the blockage worse and damage the eardrum. · If your doctor recommends that you try to remove earwax at home:  ? Soften and loosen the earwax with warm mineral oil. You also can try hydrogen peroxide mixed with an equal amount of room temperature water. Place 2 drops of the fluid, warmed to body temperature, in the ear two times a day for up to 5 days. ? Once the wax is loose and soft, all that is usually needed to remove it from the ear canal is a gentle, warm shower. Direct the water into the ear, then tip your head to let the earwax drain out. Dry your ear thoroughly with a hair dryer set on low. Hold the dryer several inches from your ear. ? If the warm mineral oil and shower do not work, use an over-the-counter wax softener. Read and follow all instructions on the label. After using the wax softener, use an ear syringe to gently flush the ear. Make sure the flushing solution is body temperature. Cool or hot fluids in the ear can cause dizziness. When should you call for help?   Call your doctor now or seek immediate medical care if:    · Pus or blood drains from your ear.     · Your ears are ringing or feel full.     · You have a loss of hearing.    Watch closely for changes in your health, and be sure to contact your doctor if:    · You have pain or reduced hearing after 1 week of home treatment.     · You have any new symptoms, such as nausea or balance problems. Where can you learn more? Go to [email or web address] and sign in to your [employer] account. Enter U372 in the [employer] box to learn more about \"Earwax Blockage: Care Instructions. \"     If you do not have an account, please click on the \"Sign Up Now\" link. Current as of: September 23, 2018  Content Version: 12.0  © 9322-3789 [employer]. Care instructions adapted under license by [facility] ([facility]). If you have questions about a medical condition or this instruction, always ask your healthcare professional. [address] any warranty or liability for your use of this information.

## 2019-05-28 LAB — INR BLD: 3.5

## 2019-05-29 ENCOUNTER — ANTI-COAG VISIT (OUTPATIENT)
Dept: CARDIOLOGY CLINIC | Age: 59
End: 2019-05-29

## 2019-06-04 NOTE — TELEPHONE ENCOUNTER
Refill request for     -       Gabapentin 300 MG              medication.      Name of Pharmacy-          [facility]  # [id number]    Last visit -05/21/19     Pending visit -   11/18/19    Last refill-     03/14/19

## 2019-06-05 RX ORDER — GABAPENTIN 300 MG/1
CAPSULE ORAL
Qty: 180 CAPSULE | Refills: 0 | Status: SHIPPED | OUTPATIENT
Start: 2019-06-05 | End: 2019-09-03 | Stop reason: SDUPTHER

## 2019-06-17 ENCOUNTER — ANTI-COAG VISIT (OUTPATIENT)
Dept: CARDIOLOGY CLINIC | Age: 59
End: 2019-06-17

## 2019-06-17 LAB — INR BLD: 3.5

## 2019-07-03 ENCOUNTER — ANTI-COAG VISIT (OUTPATIENT)
Dept: CARDIOLOGY CLINIC | Age: 59
End: 2019-07-03

## 2019-07-03 LAB — INR BLD: 3.5

## 2019-07-18 ENCOUNTER — ANTI-COAG VISIT (OUTPATIENT)
Dept: CARDIOLOGY CLINIC | Age: 59
End: 2019-07-18

## 2019-07-18 LAB — INR BLD: 3.5

## 2019-07-25 ENCOUNTER — TELEPHONE (OUTPATIENT)
Dept: CARDIOLOGY CLINIC | Age: 59
End: 2019-07-25

## 2019-07-25 DIAGNOSIS — Z95.2 H/O AORTIC VALVE REPLACEMENT: Primary | ICD-10-CM

## 2019-07-25 RX ORDER — WARFARIN SODIUM 2 MG/1
TABLET ORAL
Qty: 90 TABLET | Refills: 3 | Status: SHIPPED | OUTPATIENT
Start: 2019-07-25

## 2019-07-25 RX ORDER — WARFARIN SODIUM 2 MG/1
TABLET ORAL
Qty: 90 TABLET | Refills: 3 | Status: CANCELLED | OUTPATIENT
Start: 2019-07-25

## 2019-07-25 NOTE — TELEPHONE ENCOUNTER
Please refill. I do not need to get a message for medication refills.  Please go ahead and refill meds

## 2019-08-02 LAB — INR BLD: 3.5

## 2019-08-08 ENCOUNTER — ANTI-COAG VISIT (OUTPATIENT)
Dept: CARDIOLOGY CLINIC | Age: 59
End: 2019-08-08

## 2019-08-17 LAB — INR BLD: 3.5

## 2019-08-19 ENCOUNTER — ANTI-COAG VISIT (OUTPATIENT)
Dept: CARDIOLOGY CLINIC | Age: 59
End: 2019-08-19

## 2019-08-23 RX ORDER — POTASSIUM CHLORIDE 750 MG/1
TABLET, EXTENDED RELEASE ORAL
Qty: 180 TABLET | Refills: 2 | Status: SHIPPED | OUTPATIENT
Start: 2019-08-23 | End: 2019-08-23 | Stop reason: SDUPTHER

## 2019-08-23 RX ORDER — POTASSIUM CHLORIDE 750 MG/1
10 TABLET, EXTENDED RELEASE ORAL DAILY
Qty: 180 TABLET | Refills: 2 | Status: SHIPPED | OUTPATIENT
Start: 2019-08-23

## 2019-09-03 LAB — INR BLD: 3.5

## 2019-09-03 RX ORDER — GABAPENTIN 300 MG/1
CAPSULE ORAL
Qty: 180 CAPSULE | Refills: 0 | Status: SHIPPED | OUTPATIENT
Start: 2019-09-03 | End: 2019-01-01 | Stop reason: SDUPTHER

## 2019-09-04 ENCOUNTER — ANTI-COAG VISIT (OUTPATIENT)
Dept: CARDIOLOGY CLINIC | Age: 59
End: 2019-09-04

## 2019-09-09 NOTE — TELEPHONE ENCOUNTER
Refill request for metoprolol medication.      Name of Pharmacy- [facility]    Last visit - 5-21-19     Pending visit - 11-18-19    Last refill -6-4-19    Medication Contract signed -   Aime cox-     Additional Comments

## 2019-09-11 RX ORDER — SIMVASTATIN 40 MG
40 TABLET ORAL NIGHTLY
Qty: 90 TABLET | Refills: 1 | Status: SHIPPED | OUTPATIENT
Start: 2019-09-11 | End: 2020-01-01

## 2019-09-11 NOTE — TELEPHONE ENCOUNTER
Refill request for Zocor 40 MG medication.      Name of Pharmacy- [facility]    Last visit - 5/21/2019       Pending visit -   Future Appointments   Date Time Provider Boaz Enriquez   11/18/2019  8:00 AM MD DENNIS Sam AND LINCOLN COLLINS         Last refill -3/19    Medication Contract signed -5/18   Last Oarrs ran- 9/19    Additional Comments

## 2019-09-26 LAB — INR BLD: 3.5

## 2019-09-30 ENCOUNTER — ANTI-COAG VISIT (OUTPATIENT)
Dept: CARDIOLOGY CLINIC | Age: 59
End: 2019-09-30

## 2020-01-01 ENCOUNTER — ANTI-COAG VISIT (OUTPATIENT)
Dept: CARDIOLOGY CLINIC | Age: 60
End: 2020-01-01

## 2020-01-01 ENCOUNTER — OFFICE VISIT (OUTPATIENT)
Dept: CARDIOLOGY CLINIC | Age: 60
End: 2020-01-01
Payer: COMMERCIAL

## 2020-01-01 ENCOUNTER — OFFICE VISIT (OUTPATIENT)
Dept: INTERNAL MEDICINE CLINIC | Age: 60
End: 2020-01-01
Payer: COMMERCIAL

## 2020-01-01 ENCOUNTER — TELEPHONE (OUTPATIENT)
Dept: ORTHOPEDIC SURGERY | Age: 60
End: 2020-01-01

## 2020-01-01 ENCOUNTER — INITIAL CONSULT (OUTPATIENT)
Dept: NEUROLOGY | Age: 60
End: 2020-01-01
Payer: COMMERCIAL

## 2020-01-01 ENCOUNTER — VIRTUAL VISIT (OUTPATIENT)
Dept: INTERNAL MEDICINE CLINIC | Age: 60
End: 2020-01-01
Payer: COMMERCIAL

## 2020-01-01 ENCOUNTER — HOSPITAL ENCOUNTER (EMERGENCY)
Age: 60
Discharge: HOME | End: 2020-08-17
Attending: EMERGENCY MEDICINE
Payer: COMMERCIAL

## 2020-01-01 ENCOUNTER — TELEPHONE (OUTPATIENT)
Dept: INTERNAL MEDICINE CLINIC | Age: 60
End: 2020-01-01

## 2020-01-01 ENCOUNTER — ANTI-COAG VISIT (OUTPATIENT)
Dept: CARDIOLOGY CLINIC | Age: 60
End: 2020-01-01
Payer: COMMERCIAL

## 2020-01-01 ENCOUNTER — OFFICE VISIT (OUTPATIENT)
Dept: ORTHOPEDIC SURGERY | Age: 60
End: 2020-01-01
Payer: COMMERCIAL

## 2020-01-01 ENCOUNTER — HOSPITAL ENCOUNTER (OUTPATIENT)
Age: 60
Setting detail: OUTPATIENT SURGERY
Discharge: HOME | End: 2020-01-07
Attending: PHYSICAL MEDICINE & REHABILITATION | Admitting: PHYSICAL MEDICINE & REHABILITATION
Payer: COMMERCIAL

## 2020-01-01 ENCOUNTER — NURSE TRIAGE (OUTPATIENT)
Dept: OTHER | Facility: CLINIC | Age: 60
End: 2020-01-01

## 2020-01-01 ENCOUNTER — PATIENT MESSAGE (OUTPATIENT)
Dept: INTERNAL MEDICINE CLINIC | Age: 60
End: 2020-01-01

## 2020-01-01 ENCOUNTER — TELEPHONE (OUTPATIENT)
Dept: CARDIOLOGY CLINIC | Age: 60
End: 2020-01-01

## 2020-01-01 ENCOUNTER — HOSPITAL ENCOUNTER (OUTPATIENT)
Age: 60
Discharge: HOME | End: 2020-08-19
Payer: COMMERCIAL

## 2020-01-01 ENCOUNTER — HOSPITAL ENCOUNTER (OUTPATIENT)
Age: 60
Setting detail: OUTPATIENT SURGERY
Discharge: HOME | End: 2020-02-24
Attending: PHYSICAL MEDICINE & REHABILITATION | Admitting: PHYSICAL MEDICINE & REHABILITATION
Payer: COMMERCIAL

## 2020-01-01 ENCOUNTER — APPOINTMENT (OUTPATIENT)
Dept: CT IMAGING | Age: 60
End: 2020-01-01
Payer: COMMERCIAL

## 2020-01-01 ENCOUNTER — HOSPITAL ENCOUNTER (OUTPATIENT)
Dept: CT IMAGING | Age: 60
Discharge: HOME | End: 2020-08-27
Payer: COMMERCIAL

## 2020-01-01 ENCOUNTER — HOSPITAL ENCOUNTER (OUTPATIENT)
Dept: CT IMAGING | Age: 60
Discharge: HOME | End: 2020-08-13
Payer: COMMERCIAL

## 2020-01-01 ENCOUNTER — HOSPITAL ENCOUNTER (OUTPATIENT)
Age: 60
Setting detail: OUTPATIENT SURGERY
Discharge: HOME | End: 2020-01-28
Attending: PHYSICAL MEDICINE & REHABILITATION | Admitting: PHYSICAL MEDICINE & REHABILITATION
Payer: COMMERCIAL

## 2020-01-01 VITALS
OXYGEN SATURATION: 96 % | SYSTOLIC BLOOD PRESSURE: 105 MMHG | WEIGHT: 130 LBS | TEMPERATURE: 97.5 F | BODY MASS INDEX: 23.04 KG/M2 | HEIGHT: 63 IN | HEART RATE: 48 BPM | DIASTOLIC BLOOD PRESSURE: 62 MMHG

## 2020-01-01 VITALS
BODY MASS INDEX: 23.57 KG/M2 | WEIGHT: 133 LBS | TEMPERATURE: 97 F | HEART RATE: 57 BPM | RESPIRATION RATE: 16 BRPM | DIASTOLIC BLOOD PRESSURE: 52 MMHG | HEIGHT: 63 IN | OXYGEN SATURATION: 96 % | SYSTOLIC BLOOD PRESSURE: 133 MMHG

## 2020-01-01 VITALS
SYSTOLIC BLOOD PRESSURE: 140 MMHG | HEART RATE: 78 BPM | OXYGEN SATURATION: 95 % | DIASTOLIC BLOOD PRESSURE: 86 MMHG | BODY MASS INDEX: 22.85 KG/M2 | TEMPERATURE: 98 F | WEIGHT: 129 LBS

## 2020-01-01 VITALS
HEIGHT: 63 IN | SYSTOLIC BLOOD PRESSURE: 136 MMHG | TEMPERATURE: 97.2 F | DIASTOLIC BLOOD PRESSURE: 67 MMHG | HEART RATE: 49 BPM | BODY MASS INDEX: 23.57 KG/M2 | RESPIRATION RATE: 16 BRPM | OXYGEN SATURATION: 97 % | WEIGHT: 133 LBS

## 2020-01-01 VITALS
BODY MASS INDEX: 23.04 KG/M2 | DIASTOLIC BLOOD PRESSURE: 48 MMHG | WEIGHT: 130 LBS | HEIGHT: 63 IN | TEMPERATURE: 98 F | SYSTOLIC BLOOD PRESSURE: 127 MMHG | RESPIRATION RATE: 16 BRPM | HEART RATE: 78 BPM | OXYGEN SATURATION: 96 %

## 2020-01-01 VITALS
DIASTOLIC BLOOD PRESSURE: 80 MMHG | SYSTOLIC BLOOD PRESSURE: 124 MMHG | RESPIRATION RATE: 16 BRPM | OXYGEN SATURATION: 97 % | HEART RATE: 57 BPM | TEMPERATURE: 98.1 F | WEIGHT: 131 LBS | BODY MASS INDEX: 23.21 KG/M2

## 2020-01-01 VITALS
BODY MASS INDEX: 23.39 KG/M2 | DIASTOLIC BLOOD PRESSURE: 62 MMHG | HEART RATE: 64 BPM | SYSTOLIC BLOOD PRESSURE: 112 MMHG | OXYGEN SATURATION: 97 % | HEIGHT: 63 IN | WEIGHT: 132 LBS

## 2020-01-01 VITALS
BODY MASS INDEX: 23.04 KG/M2 | HEIGHT: 63 IN | WEIGHT: 130 LBS | OXYGEN SATURATION: 95 % | DIASTOLIC BLOOD PRESSURE: 86 MMHG | SYSTOLIC BLOOD PRESSURE: 144 MMHG | HEART RATE: 95 BPM

## 2020-01-01 VITALS — BODY MASS INDEX: 23.55 KG/M2 | WEIGHT: 132.94 LBS | HEIGHT: 63 IN

## 2020-01-01 VITALS
SYSTOLIC BLOOD PRESSURE: 121 MMHG | TEMPERATURE: 97 F | DIASTOLIC BLOOD PRESSURE: 66 MMHG | BODY MASS INDEX: 23.39 KG/M2 | WEIGHT: 132 LBS | HEART RATE: 60 BPM | RESPIRATION RATE: 16 BRPM | OXYGEN SATURATION: 95 % | HEIGHT: 63 IN

## 2020-01-01 VITALS
HEART RATE: 53 BPM | BODY MASS INDEX: 23.4 KG/M2 | WEIGHT: 132.06 LBS | HEIGHT: 63 IN | DIASTOLIC BLOOD PRESSURE: 64 MMHG | SYSTOLIC BLOOD PRESSURE: 119 MMHG

## 2020-01-01 VITALS — WEIGHT: 132.94 LBS | BODY MASS INDEX: 23.55 KG/M2 | HEIGHT: 63 IN

## 2020-01-01 LAB
ANION GAP SERPL CALCULATED.3IONS-SCNC: 12 MMOL/L (ref 3–16)
BUN BLDV-MCNC: 10 MG/DL (ref 7–20)
C-REACTIVE PROTEIN: 4.1 MG/L (ref 0–5.1)
CALCIUM SERPL-MCNC: 9.5 MG/DL (ref 8.3–10.6)
CHLORIDE BLD-SCNC: 100 MMOL/L (ref 99–110)
CO2: 25 MMOL/L (ref 21–32)
CREAT SERPL-MCNC: 0.7 MG/DL (ref 0.6–1.1)
GFR AFRICAN AMERICAN: >60
GFR NON-AFRICAN AMERICAN: >60
GLUCOSE BLD-MCNC: 104 MG/DL (ref 70–99)
HCT VFR BLD CALC: 43 % (ref 36–48)
HEMOGLOBIN: 14.4 G/DL (ref 12–16)
INR BLD: 1.12 (ref 0.86–1.14)
INR BLD: 2.2
INR BLD: 2.5
INR BLD: 2.7
INR BLD: 3
INR BLD: 3.5
MCH RBC QN AUTO: 27.7 PG (ref 26–34)
MCHC RBC AUTO-ENTMCNC: 33.4 G/DL (ref 31–36)
MCV RBC AUTO: 83.2 FL (ref 80–100)
PDW BLD-RTO: 16 % (ref 12.4–15.4)
PLATELET # BLD: 133 K/UL (ref 135–450)
PMV BLD AUTO: 11.4 FL (ref 5–10.5)
POTASSIUM REFLEX MAGNESIUM: 4.6 MMOL/L (ref 3.5–5.1)
PROTHROMBIN TIME: 13 SEC (ref 10–13.2)
RBC # BLD: 5.17 M/UL (ref 4–5.2)
SEDIMENTATION RATE, ERYTHROCYTE: 8 MM/HR (ref 0–30)
SODIUM BLD-SCNC: 137 MMOL/L (ref 136–145)
WBC # BLD: 5.6 K/UL (ref 4–11)

## 2020-01-01 PROCEDURE — 70498 CT ANGIOGRAPHY NECK: CPT

## 2020-01-01 PROCEDURE — 6360000002 HC RX W HCPCS: Performed by: EMERGENCY MEDICINE

## 2020-01-01 PROCEDURE — 70450 CT HEAD/BRAIN W/O DYE: CPT

## 2020-01-01 PROCEDURE — 7100000010 HC PHASE II RECOVERY - FIRST 15 MIN: Performed by: PHYSICAL MEDICINE & REHABILITATION

## 2020-01-01 PROCEDURE — 99212 OFFICE O/P EST SF 10 MIN: CPT | Performed by: NURSE PRACTITIONER

## 2020-01-01 PROCEDURE — 2709999900 HC NON-CHARGEABLE SUPPLY: Performed by: PHYSICAL MEDICINE & REHABILITATION

## 2020-01-01 PROCEDURE — 36415 COLL VENOUS BLD VENIPUNCTURE: CPT

## 2020-01-01 PROCEDURE — 2580000003 HC RX 258: Performed by: EMERGENCY MEDICINE

## 2020-01-01 PROCEDURE — 93793 ANTICOAG MGMT PT WARFARIN: CPT | Performed by: INTERNAL MEDICINE

## 2020-01-01 PROCEDURE — 96375 TX/PRO/DX INJ NEW DRUG ADDON: CPT

## 2020-01-01 PROCEDURE — 99152 MOD SED SAME PHYS/QHP 5/>YRS: CPT | Performed by: PHYSICAL MEDICINE & REHABILITATION

## 2020-01-01 PROCEDURE — 6360000004 HC RX CONTRAST MEDICATION: Performed by: EMERGENCY MEDICINE

## 2020-01-01 PROCEDURE — 7100000011 HC PHASE II RECOVERY - ADDTL 15 MIN: Performed by: PHYSICAL MEDICINE & REHABILITATION

## 2020-01-01 PROCEDURE — 99214 OFFICE O/P EST MOD 30 MIN: CPT | Performed by: INTERNAL MEDICINE

## 2020-01-01 PROCEDURE — 99213 OFFICE O/P EST LOW 20 MIN: CPT | Performed by: PHYSICAL MEDICINE & REHABILITATION

## 2020-01-01 PROCEDURE — 99283 EMERGENCY DEPT VISIT LOW MDM: CPT

## 2020-01-01 PROCEDURE — 3600000002 HC SURGERY LEVEL 2 BASE: Performed by: PHYSICAL MEDICINE & REHABILITATION

## 2020-01-01 PROCEDURE — 85610 PROTHROMBIN TIME: CPT

## 2020-01-01 PROCEDURE — 99213 OFFICE O/P EST LOW 20 MIN: CPT | Performed by: INTERNAL MEDICINE

## 2020-01-01 PROCEDURE — 3600000012 HC SURGERY LEVEL 2 ADDTL 15MIN: Performed by: PHYSICAL MEDICINE & REHABILITATION

## 2020-01-01 PROCEDURE — 85652 RBC SED RATE AUTOMATED: CPT

## 2020-01-01 PROCEDURE — 85027 COMPLETE CBC AUTOMATED: CPT

## 2020-01-01 PROCEDURE — 6360000002 HC RX W HCPCS: Performed by: STUDENT IN AN ORGANIZED HEALTH CARE EDUCATION/TRAINING PROGRAM

## 2020-01-01 PROCEDURE — 6370000000 HC RX 637 (ALT 250 FOR IP): Performed by: EMERGENCY MEDICINE

## 2020-01-01 PROCEDURE — 86140 C-REACTIVE PROTEIN: CPT

## 2020-01-01 PROCEDURE — 96366 THER/PROPH/DIAG IV INF ADDON: CPT

## 2020-01-01 PROCEDURE — 6360000004 HC RX CONTRAST MEDICATION: Performed by: PSYCHIATRY & NEUROLOGY

## 2020-01-01 PROCEDURE — 80048 BASIC METABOLIC PNL TOTAL CA: CPT

## 2020-01-01 PROCEDURE — 2500000003 HC RX 250 WO HCPCS: Performed by: PHYSICAL MEDICINE & REHABILITATION

## 2020-01-01 PROCEDURE — 6360000002 HC RX W HCPCS: Performed by: PHYSICAL MEDICINE & REHABILITATION

## 2020-01-01 PROCEDURE — 99213 OFFICE O/P EST LOW 20 MIN: CPT | Performed by: PHYSICIAN ASSISTANT

## 2020-01-01 PROCEDURE — 99204 OFFICE O/P NEW MOD 45 MIN: CPT | Performed by: PSYCHIATRY & NEUROLOGY

## 2020-01-01 PROCEDURE — 70470 CT HEAD/BRAIN W/O & W/DYE: CPT

## 2020-01-01 PROCEDURE — 96365 THER/PROPH/DIAG IV INF INIT: CPT

## 2020-01-01 PROCEDURE — 2580000003 HC RX 258: Performed by: PHYSICAL MEDICINE & REHABILITATION

## 2020-01-01 PROCEDURE — 99213 OFFICE O/P EST LOW 20 MIN: CPT | Performed by: NURSE PRACTITIONER

## 2020-01-01 RX ORDER — BUTALBITAL, ACETAMINOPHEN AND CAFFEINE 50; 325; 40 MG/1; MG/1; MG/1
1 TABLET ORAL EVERY 6 HOURS PRN
Qty: 20 TABLET | Refills: 0 | Status: SHIPPED | OUTPATIENT
Start: 2020-01-01

## 2020-01-01 RX ORDER — FENTANYL CITRATE 50 UG/ML
INJECTION, SOLUTION INTRAMUSCULAR; INTRAVENOUS PRN
Status: DISCONTINUED | OUTPATIENT
Start: 2020-01-01 | End: 2020-01-01 | Stop reason: ALTCHOICE

## 2020-01-01 RX ORDER — BUSPIRONE HYDROCHLORIDE 10 MG/1
TABLET ORAL
Qty: 90 TABLET | Refills: 4 | Status: SHIPPED | OUTPATIENT
Start: 2020-01-01

## 2020-01-01 RX ORDER — SODIUM CHLORIDE, SODIUM LACTATE, POTASSIUM CHLORIDE, CALCIUM CHLORIDE 600; 310; 30; 20 MG/100ML; MG/100ML; MG/100ML; MG/100ML
INJECTION, SOLUTION INTRAVENOUS ONCE
Status: COMPLETED | OUTPATIENT
Start: 2020-01-01 | End: 2020-01-01

## 2020-01-01 RX ORDER — 0.9 % SODIUM CHLORIDE 0.9 %
1000 INTRAVENOUS SOLUTION INTRAVENOUS ONCE
Status: COMPLETED | OUTPATIENT
Start: 2020-01-01 | End: 2020-01-01

## 2020-01-01 RX ORDER — MAGNESIUM SULFATE HEPTAHYDRATE 500 MG/ML
2 INJECTION, SOLUTION INTRAMUSCULAR; INTRAVENOUS ONCE
Status: DISCONTINUED | OUTPATIENT
Start: 2020-01-01 | End: 2020-01-01

## 2020-01-01 RX ORDER — KETOROLAC TROMETHAMINE 30 MG/ML
30 INJECTION, SOLUTION INTRAMUSCULAR; INTRAVENOUS ONCE
Status: DISCONTINUED | OUTPATIENT
Start: 2020-01-01 | End: 2020-01-01

## 2020-01-01 RX ORDER — AMLODIPINE BESYLATE 5 MG/1
5 TABLET ORAL DAILY
Qty: 30 TABLET | Refills: 5 | Status: SHIPPED | OUTPATIENT
Start: 2020-01-01

## 2020-01-01 RX ORDER — BUTALBITAL, ACETAMINOPHEN AND CAFFEINE 50; 325; 40 MG/1; MG/1; MG/1
1 TABLET ORAL EVERY 6 HOURS PRN
Qty: 20 TABLET | Refills: 0 | Status: SHIPPED | OUTPATIENT
Start: 2020-01-01 | End: 2020-01-01

## 2020-01-01 RX ORDER — DEXAMETHASONE SODIUM PHOSPHATE 4 MG/ML
4 INJECTION, SOLUTION INTRA-ARTICULAR; INTRALESIONAL; INTRAMUSCULAR; INTRAVENOUS; SOFT TISSUE ONCE
Status: COMPLETED | OUTPATIENT
Start: 2020-01-01 | End: 2020-01-01

## 2020-01-01 RX ORDER — MIDAZOLAM HYDROCHLORIDE 1 MG/ML
INJECTION INTRAMUSCULAR; INTRAVENOUS PRN
Status: DISCONTINUED | OUTPATIENT
Start: 2020-01-01 | End: 2020-01-01 | Stop reason: ALTCHOICE

## 2020-01-01 RX ORDER — SUMATRIPTAN 50 MG/1
50 TABLET, FILM COATED ORAL
Qty: 9 TABLET | Refills: 0 | Status: SHIPPED | OUTPATIENT
Start: 2020-01-01 | End: 2020-01-01

## 2020-01-01 RX ORDER — AMITRIPTYLINE HYDROCHLORIDE 25 MG/1
TABLET, FILM COATED ORAL
Qty: 30 TABLET | Refills: 2 | OUTPATIENT
Start: 2020-01-01

## 2020-01-01 RX ORDER — AMOXICILLIN 500 MG/1
500 CAPSULE ORAL 3 TIMES DAILY
Qty: 21 CAPSULE | Refills: 0 | Status: SHIPPED | OUTPATIENT
Start: 2020-01-01 | End: 2020-01-01

## 2020-01-01 RX ORDER — HYDROCHLOROTHIAZIDE 25 MG/1
TABLET ORAL
Qty: 90 TABLET | Refills: 3 | Status: SHIPPED | OUTPATIENT
Start: 2020-01-01

## 2020-01-01 RX ORDER — METOCLOPRAMIDE HYDROCHLORIDE 5 MG/ML
10 INJECTION INTRAMUSCULAR; INTRAVENOUS ONCE
Status: COMPLETED | OUTPATIENT
Start: 2020-01-01 | End: 2020-01-01

## 2020-01-01 RX ORDER — AMITRIPTYLINE HYDROCHLORIDE 25 MG/1
25 TABLET, FILM COATED ORAL NIGHTLY
Qty: 30 TABLET | Refills: 2 | Status: SHIPPED | OUTPATIENT
Start: 2020-01-01

## 2020-01-01 RX ORDER — DIPHENHYDRAMINE HYDROCHLORIDE 50 MG/ML
25 INJECTION INTRAMUSCULAR; INTRAVENOUS ONCE
Status: COMPLETED | OUTPATIENT
Start: 2020-01-01 | End: 2020-01-01

## 2020-01-01 RX ORDER — MAGNESIUM SULFATE IN WATER 40 MG/ML
2 INJECTION, SOLUTION INTRAVENOUS ONCE
Status: COMPLETED | OUTPATIENT
Start: 2020-01-01 | End: 2020-01-01

## 2020-01-01 RX ORDER — BUPIVACAINE HYDROCHLORIDE 7.5 MG/ML
INJECTION, SOLUTION EPIDURAL; RETROBULBAR PRN
Status: DISCONTINUED | OUTPATIENT
Start: 2020-01-01 | End: 2020-01-01 | Stop reason: ALTCHOICE

## 2020-01-01 RX ORDER — ACETAMINOPHEN 500 MG
1000 TABLET ORAL ONCE
Status: COMPLETED | OUTPATIENT
Start: 2020-01-01 | End: 2020-01-01

## 2020-01-01 RX ORDER — LIDOCAINE HYDROCHLORIDE 20 MG/ML
INJECTION, SOLUTION EPIDURAL; INFILTRATION; INTRACAUDAL; PERINEURAL PRN
Status: DISCONTINUED | OUTPATIENT
Start: 2020-01-01 | End: 2020-01-01 | Stop reason: ALTCHOICE

## 2020-01-01 RX ADMIN — METOCLOPRAMIDE HYDROCHLORIDE 10 MG: 5 INJECTION INTRAMUSCULAR; INTRAVENOUS at 13:03

## 2020-01-01 RX ADMIN — IOPAMIDOL 75 ML: 755 INJECTION, SOLUTION INTRAVENOUS at 16:21

## 2020-01-01 RX ADMIN — IOPAMIDOL 75 ML: 755 INJECTION, SOLUTION INTRAVENOUS at 11:35

## 2020-01-01 RX ADMIN — DIPHENHYDRAMINE HYDROCHLORIDE 25 MG: 50 INJECTION, SOLUTION INTRAMUSCULAR; INTRAVENOUS at 13:03

## 2020-01-01 RX ADMIN — MAGNESIUM SULFATE HEPTAHYDRATE 2 G: 40 INJECTION, SOLUTION INTRAVENOUS at 15:24

## 2020-01-01 RX ADMIN — SODIUM CHLORIDE, POTASSIUM CHLORIDE, SODIUM LACTATE AND CALCIUM CHLORIDE: 600; 310; 30; 20 INJECTION, SOLUTION INTRAVENOUS at 10:17

## 2020-01-01 RX ADMIN — DEXAMETHASONE SODIUM PHOSPHATE 4 MG: 4 INJECTION, SOLUTION INTRAMUSCULAR; INTRAVENOUS at 15:24

## 2020-01-01 RX ADMIN — SODIUM CHLORIDE 1000 ML: 9 INJECTION, SOLUTION INTRAVENOUS at 13:04

## 2020-01-01 RX ADMIN — LIDOCAINE HYDROCHLORIDE 0.1 ML: 10 INJECTION, SOLUTION EPIDURAL; INFILTRATION; INTRACAUDAL; PERINEURAL at 10:16

## 2020-01-01 RX ADMIN — ACETAMINOPHEN 1000 MG: 500 TABLET ORAL at 13:03

## 2020-01-01 ASSESSMENT — PATIENT HEALTH QUESTIONNAIRE - PHQ9
1. LITTLE INTEREST OR PLEASURE IN DOING THINGS: 0
SUM OF ALL RESPONSES TO PHQ QUESTIONS 1-9: 0
SUM OF ALL RESPONSES TO PHQ QUESTIONS 1-9: 0
SUM OF ALL RESPONSES TO PHQ9 QUESTIONS 1 & 2: 0
SUM OF ALL RESPONSES TO PHQ QUESTIONS 1-9: 0
1. LITTLE INTEREST OR PLEASURE IN DOING THINGS: 0
2. FEELING DOWN, DEPRESSED OR HOPELESS: 0
SUM OF ALL RESPONSES TO PHQ9 QUESTIONS 1 & 2: 0
SUM OF ALL RESPONSES TO PHQ QUESTIONS 1-9: 0
2. FEELING DOWN, DEPRESSED OR HOPELESS: 0

## 2020-01-01 ASSESSMENT — ENCOUNTER SYMPTOMS
BLOOD IN STOOL: 0
RHINORRHEA: 0
ABDOMINAL PAIN: 0
WHEEZING: 0
EYE DISCHARGE: 0
NAUSEA: 0
NAUSEA: 0
COUGH: 0
SHORTNESS OF BREATH: 0
VOMITING: 0
PHOTOPHOBIA: 1
CONSTIPATION: 0
VOMITING: 0
SHORTNESS OF BREATH: 0
EYE PAIN: 0
CHEST TIGHTNESS: 0
CONSTIPATION: 0
VOMITING: 0
COUGH: 0
DIARRHEA: 0
NAUSEA: 0
DIARRHEA: 0
DIARRHEA: 0
ABDOMINAL DISTENTION: 0
CONSTIPATION: 0
ABDOMINAL PAIN: 0

## 2020-01-01 ASSESSMENT — PAIN SCALES - GENERAL
PAINLEVEL_OUTOF10: 7

## 2020-01-01 ASSESSMENT — PAIN - FUNCTIONAL ASSESSMENT
PAIN_FUNCTIONAL_ASSESSMENT: 0-10
PAIN_FUNCTIONAL_ASSESSMENT: PREVENTS OR INTERFERES SOME ACTIVE ACTIVITIES AND ADLS
PAIN_FUNCTIONAL_ASSESSMENT: 0-10
PAIN_FUNCTIONAL_ASSESSMENT: 0-10

## 2020-01-01 ASSESSMENT — PAIN DESCRIPTION - DESCRIPTORS
DESCRIPTORS: ACHING;SHARP;DULL
DESCRIPTORS: ACHING;SHARP
DESCRIPTORS: SHARP;ACHING;DULL

## 2020-01-07 NOTE — H&P
HISTORY AND PHYSICAL/PRE-SEDATION ASSESSMENT    Patient:  Rima Blancas   :  1960  Medical Record No.:  [id number]   Date:  2020  Physician:  Derrick Palencia M.D. Facility: [facility]     Nursing History and Physical reviewed and agreed upon. Additional findings:    Allergies:  Patient has no known allergies. Home Medications:    Prior to Admission medications    Medication Sig Start Date End Date Taking? Authorizing Provider   aspirin 325 MG EC tablet TAKE 1 TABLET BY MOUTH EVERY DAY 20   Sheila Padron MD   enoxaparin (LOVENOX) 60 MG/0.6ML injection Inject 0.6 mLs into the skin 2 times daily 19   Aden Benítez MD   gabapentin (NEURONTIN) 300 MG capsule TAKE ONE CAPSULE BY MOUTH TWICE A DAY FOR NEUROPATHY 12/8/19 3/7/20  Sheila Padron MD   enoxaparin (LOVENOX) 60 MG/0.6ML injection Inject 0.6 mLs into the skin 2 times daily 19   Aden Benítez MD   simvastatin (ZOCOR) 40 MG tablet Take 1 tablet by mouth nightly 19   CESAR Motta CNP   metoprolol tartrate (LOPRESSOR) 25 MG tablet TAKE 1/2 TABLET BY MOUTH TWICE A DAY FOR HIGH BLOOD PRESSURE 19   CESAR Motta CNP   potassium chloride (KLOR-CON M10) 10 MEQ extended release tablet Take 1 tablet by mouth daily 19   Aden Benítez MD   warfarin (COUMADIN) 2 MG tablet TAKE 1 TABLET BY MOUTH DAILY 19   Aden Benítez MD   hydrochlorothiazide (HYDRODIURIL) 25 MG tablet TAKE 1 TABLET BY MOUTH DAILY 4/10/19   Aden Benítez MD   busPIRone (BUSPAR) 10 MG tablet TAKE ONE-HALF TABLET BY MOUTH TWICE A DAY FOR ANXIETY 19   Sheila Padron MD   methocarbamol (ROBAXIN-750) 750 MG tablet I po BID PRN 18   Gisselle Solorio PA-C   MINIVELLE 0.05 MG/24HR  16   Historical Provider, MD   fluticasone (FLONASE) 50 MCG/ACT nasal spray 2 sprays by Nasal route daily.  3/17/15   Sheila Padron MD   meclizine (ANTIVERT) 25 MG tablet Take 1 tablet by

## 2020-01-15 NOTE — LETTER
[facility]    Please Schedule the following with: Dr. Bagley Setting    Date:  1/15/20     Patient: Sherwin Tate     YOB: 1960    Patient Home Phone: [phone] (home)    Diagnosis: Lumbar spondylosis M47.816  []LT     []RT     [x]BLANCHE     []Midline    Levels: Bilateral L3-4-5 medial branch blocks #2 of 2    []Cervical ABDIEL 82452, 34335  [x]L-MBB 67836, 73929  []SI Joint 68804   []C-FACET 66164, 20970, 83244  []L-FACET 51664, 39851  []Interlaminar ABDIEL 79005     []HIP 06430    []C-MBB  []Transforaminal ABDIEL 50622  []Neurotomy 23470, 46774, 37226    Attending Physician: Stefany Palacios    Injection Schedule for: 1/28/2020 AT 1230PM     At: [facility]    First Insurance: QuanDx #:  Second Insurance:                 Pre-cert #:    Comments: Diagnostic MBB x1 1/7/2020    SEDATION:       [] IV           [] ORAL    [] Blood Thinner:                 []Diabetic           []Antibiotic:               []Glaucoma:    [] Pacemaker/defib       [] Current Open Wounds, Lacerations or Sores     Allergies: No Known Allergies    Past Medical History:   Diagnosis Date    Anxiety     Anxiety     Aortic valve defect 2008    congenital,Ao replaced,Coronary angiogram= Normal    Depression     HTN (hypertension)     Hyperlipidemia     Hyperlipidemia     Hypertension     LONG TERM ANTICOAGULENT USE     Palpitations     Pneumonia     Psoriasis         Current Outpatient Medications   Medication Sig Dispense Refill    aspirin 325 MG EC tablet TAKE 1 TABLET BY MOUTH EVERY DAY 90 tablet 3    enoxaparin (LOVENOX) 60 MG/0.6ML injection Inject 0.6 mLs into the skin 2 times daily 20 Syringe 1    gabapentin (NEURONTIN) 300 MG capsule TAKE ONE CAPSULE BY MOUTH TWICE A DAY FOR NEUROPATHY 180 capsule 2    enoxaparin (LOVENOX) 60 MG/0.6ML injection Inject 0.6 mLs into the skin 2 times daily 14 Syringe 0  simvastatin (ZOCOR) 40 MG tablet Take 1 tablet by mouth nightly 90 tablet 1    metoprolol tartrate (LOPRESSOR) 25 MG tablet TAKE 1/2 TABLET BY MOUTH TWICE A DAY FOR HIGH BLOOD PRESSURE 90 tablet 3    potassium chloride (KLOR-CON M10) 10 MEQ extended release tablet Take 1 tablet by mouth daily 180 tablet 2    warfarin (COUMADIN) 2 MG tablet TAKE 1 TABLET BY MOUTH DAILY 90 tablet 3    hydrochlorothiazide (HYDRODIURIL) 25 MG tablet TAKE 1 TABLET BY MOUTH DAILY 90 tablet 3    busPIRone (BUSPAR) 10 MG tablet TAKE ONE-HALF TABLET BY MOUTH TWICE A DAY FOR ANXIETY 45 tablet 9    methocarbamol (ROBAXIN-750) 750 MG tablet I po BID PRN 60 tablet 1    MINIVELLE 0.05 MG/24HR   11    fluticasone (FLONASE) 50 MCG/ACT nasal spray 2 sprays by Nasal route daily. 1 Bottle 0    meclizine (ANTIVERT) 25 MG tablet Take 1 tablet by mouth 3 times daily as needed for Dizziness. 20 tablet 0    albuterol (PROVENTIL HFA;VENTOLIN HFA) 108 (90 BASE) MCG/ACT inhaler Inhale 2 puffs into the lungs every 6 hours as needed for Wheezing. 1 Inhaler 3     No current facility-administered medications for this visit. [facility]                     ______________________________________________________________________      [address] EMILIANO      1. Admit to preop. 2. Start IV 1000 ml LR at [facility] 111 or _____ml/hr for planned conscious sedation     3. May inject 1 % Lidocaine 0.1 ml Intradermal to numb IV site     4. Protime/INR if patient is on Coumadin     5. Urine Pregnancy Test (females only) - 12 -50 years     6. Accu Check Glucose if diabetic. Notify physician if <80 or >250.      7. Sedate all neurotomies          ______________________________________________________________________    POST-OPERATIVE ORDERS - DR. CUMMINGS      1. Admit to Post Op Phase 2     2.  Implement Standards of Care for Phase 2 Post Op 3. Check Site - May discharge when site is free of bleeding     4. Discharge to home after meets Phase 2 criteria     5. Discharge cervical patients after 30 minutes and when meets Phase 2 criteria. 6. Give discharge instruction sheet     7. For Diabetic patient, if blood sugar less than 80 in preop,          Recheck blood sugar in Post Op. 8. Discontinue IV     9.  For Nausea may give Zofran 4 MG IV/IM/ODT           ______________________________________________________________________    Reji Mask     1960        1/15/20 9:55 AM

## 2020-01-15 NOTE — PROGRESS NOTES
breastfeeding. GENERAL EXAM:  · General Apparence: Patient is adequately groomed with no evidence of malnutrition. · Orientation: The patient is oriented to time, place and person. · Mood & Affect:The patient's mood and affect are appropriate   · Vascular: Examination reveals no swelling tenderness in upper or lower extremities. · Lymphatic: The lymphatic examination bilaterally reveals all areas to be without enlargement or induration  · Sensation: Sensation is intact without deficit  LUMBAR/SACRAL EXAMINATION:  · Inspection: Mild thoracic kyphosis  · Palpation:  Diffuse thoracic tenderness. No tenderness bilaterally at the paraspinal or trochanters. There is no step-off or paraspinal spasm. · Range of Motion:  Mild to moderate loss  · Strength:   Strength testing is 5/5 in all muscle groups tested. · Special Tests:   Straight leg raise and crossed SLR negative. Leg length and pelvis level.  0 out of 5 Landon's signs. · Skin: There are no rashes, ulcerations or lesions. · Reflexes: Reflexes are symmetrically 2+ at the patellar and ankle tendons. Clonus absent bilaterally at the feet. · Gait & station: Normal unassisted  · Additional Examinations:   · RIGHT LOWER EXTREMITY: Inspection/examination of the right lower extremity does not show any tenderness, deformity or injury. Range of motion is full. There is no gross instability. There are no rashes, ulcerations or lesions. Strength and tone are normal.  · LEFT LOWER EXTREMITY:  Inspection/examination of the left lower extremity does not show any tenderness, deformity or injury. Range of motion is full. There is no gross instability. There are no rashes, ulcerations or lesions.   Strength and tone are normal.    Diagnostic Testing:   February 2, 2018  EXAMINATION:   CT OF THE THORACIC SPINE WITHOUT CONTRAST  2/2/2018 3:10 pm:       TECHNIQUE:   CT of the thoracic spine was performed without the administration of   intravenous contrast.

## 2020-01-15 NOTE — LETTER
[facility]  Surgery Precert & Billing Form:    DEMOGRAPHICS:                                                                                                       Patient Name:  Jess Ash  Patient :  1960   Patient SS#:      Patient Phone:  [phone] (home)  Alt.  Patient Phone:    Patient Address:  [address] [place] [id number]    PCP:  Sherif Yap MD  Insurance: [employer]    DIAGNOSIS & PROCEDURE:                                                                                      Diagnosis: M47.816  Operation: bilateral L3, L4, L5 MBB #2    SURGERY  INFORMATION  Date of Surgery:   2020  Location:   [facility]  Type:    OUTPATIENT  23 hour hold:  NO  Surgeon:          Joselyn Guerrero MD  1/15/20     BILLING INFORMATION:                                                                                                Physician Procedure                                            CPT Codes        BILATERAL L3, L4, L5 MEDIAL BRANCH   72287, 27041  BLOCKS  #2                PA, or Fellow Procedure                                      CPT Codes

## 2020-01-16 NOTE — PROGRESS NOTES
2020     Danya Byrne (:  1960) is a 64 y.o. female, here for evaluation of the following medical concerns:  CC: Sinus congestion  HPI  Patient with HTN, hyperlipidemia, anxiety, tobacco use, history of aortic valve replacement, long-term anticoagulation use. Patient relates since last Friday the onset of nasal congestion and drainage which has been clear initially but did not turn yellow-green. She complains of maxillary pressure about her face bilaterally as well as forehead. She is felt cold but no fever chills or shakes. Raw but not painful sore throat. Unfortunately continues to smoke. Review of Systems  Review of Systems   Constitutional: negative   HENT: Sinus congestion. EYES: negative   Respiratory: Tobacco use. Gastrointestinal: negative   Endocrine: negative   Musculoskeletal: negative   Skin: negative   Allergic/Immunological: negative   Hematological: negative   Psychiatric/Behavorial: negative   CV: negative   CNS: negative   :Negative   S/E:Negative  Renal: Negative      Prior to Visit Medications    Medication Sig Taking?  Authorizing Provider   amoxicillin (AMOXIL) 500 MG capsule Take 1 capsule by mouth 3 times daily for 7 days Yes Angella Padorn MD   aspirin 325 MG EC tablet TAKE 1 TABLET BY MOUTH EVERY DAY Yes Angella Padron MD   gabapentin (NEURONTIN) 300 MG capsule TAKE ONE CAPSULE BY MOUTH TWICE A DAY FOR NEUROPATHY Yes Jam Padron MD   enoxaparin (LOVENOX) 60 MG/0.6ML injection Inject 0.6 mLs into the skin 2 times daily Yes Derrick Emery MD   simvastatin (ZOCOR) 40 MG tablet Take 1 tablet by mouth nightly Yes CESAR Bowman CNP   metoprolol tartrate (LOPRESSOR) 25 MG tablet TAKE 1/2 TABLET BY MOUTH TWICE A DAY FOR HIGH BLOOD PRESSURE Yes CESAR Bowman CNP   potassium chloride (KLOR-CON M10) 10 MEQ extended release tablet Take 1 tablet by mouth daily Yes Derrick Emery MD   warfarin (COUMADIN) 2 MG tablet TAKE 1 TABLET BY MOUTH DAILY Yes Aden Benítez MD   hydrochlorothiazide (HYDRODIURIL) 25 MG tablet TAKE 1 TABLET BY MOUTH DAILY Yes Aden Benítez MD   busPIRone (BUSPAR) 10 MG tablet TAKE ONE-HALF TABLET BY MOUTH TWICE A DAY FOR ANXIETY Yes Sheila Padron MD   MINIVELLE 0.05 MG/24HR  Yes Historical Provider, MD   fluticasone (FLONASE) 50 MCG/ACT nasal spray 2 sprays by Nasal route daily. Patient taking differently: 2 sprays by Nasal route as needed  Yes Sheila Padron MD   meclizine (ANTIVERT) 25 MG tablet Take 1 tablet by mouth 3 times daily as needed for Dizziness. Patient taking differently: Take 25 mg by mouth as needed for Dizziness  Yes Mak Olson MD   albuterol (PROVENTIL HFA;VENTOLIN HFA) 108 (90 BASE) MCG/ACT inhaler Inhale 2 puffs into the lungs every 6 hours as needed for Wheezing. Yes Brenda Sanchez APRN - CNP        Social History     Tobacco Use    Smoking status: Current Every Day Smoker     Packs/day: 1.00     Years: 20.00     Pack years: 20.00    Smokeless tobacco: Never Used   Substance Use Topics    Alcohol use: Yes     Comment: 2 x months        Vitals:    01/16/20 1056   BP: 124/80   Site: Right Upper Arm   Position: Sitting   Cuff Size: Large Adult   Pulse: 57   Resp: 16   Temp: 98.1 °F (36.7 °C)   TempSrc: Oral   SpO2: 97%   Weight: 131 lb (59.4 kg)     Estimated body mass index is 23.21 kg/m² as calculated from the following:    Height as of 1/15/20: 5' 2.99\" (1.6 m). Weight as of this encounter: 131 lb (59.4 kg). Physical Exam  Head/neck: Ears: Normal TM. No obstruction. Throat: No exudates, no erythema, no tonsillar enlargement. Neck: No lymphadenopathy. Thyroid is nonpalpable. Eyes: EOMI, PERRLA with no nystagmus  Lungs: Clear to auscultation. CV: S1-S2 normal.   LAVINIA murmur with radiation bilaterally into the carotids  . Abdominal Examination: Bowel sounds present. Soft nontender. No mass no guarding or   rebound. Spine/extremities: No edema. No tenderness to palpation.      Skin: No rash  CNS: Patient is alert, cooperative, moves all 4 limbs, ambulates without difficulty, light touch normal.  Deep tendon reflexes normal.  Good orientation. Blood pressure 124/80, pulse 57, temperature 98.1 °F (36.7 °C), temperature source Oral, resp. rate 16, weight 131 lb (59.4 kg), SpO2 97 %, not currently breastfeeding. ASSESSMENT/PLAN:  1. Acute non-recurrent maxillary sinusitis  Treat with amoxicillin. Stop smoking. Flonase. Claritin. 2. Tobacco use  Long discussion about the need to stop smoking. 3. Essential hypertension  Stable. Continue present treatment    Call if no improvement  Return to follow-up  Dr. Ashlee Leon        Return if symptoms worsen or fail to improve. An electronic signature was used to authenticate this note.     --Harsh Gould MD on 1/16/2020 at 6:16 PM

## 2020-01-16 NOTE — TELEPHONE ENCOUNTER
DOS   01/28/2020  CPT   05551   07697  DX   M47.816  OP SX AUTH  NPR     BILATERAL  LEVELS   L3  L4  L5   PROCEDURE   MEDIAL BRANCH BLOCKS  DR. EMILIANO CHAND  INSURANCE:   [address]

## 2020-01-22 NOTE — TELEPHONE ENCOUNTER
Refill request for BUSPIRONE HCL 10 MG TABLET medication.      Name of Pharmacy- [facility]    Last visit - 1-     Pending visit - 4-    Last refill - 10-    Medication Contract signed - 5-  Last Cristo Bustamante ran- 9-3-2019    Additional Comments

## 2020-01-28 NOTE — H&P
HISTORY AND PHYSICAL/PRE-SEDATION ASSESSMENT    Patient:  Jayne Mckeon   :  1960  Medical Record No.:  [id number]   Date:  2020  Physician:  Grecia Waters M.D. Facility: [facility]     Nursing History and Physical reviewed and agreed upon. Additional findings:    Allergies:  Patient has no known allergies. Home Medications:    Prior to Admission medications    Medication Sig Start Date End Date Taking? Authorizing Provider   busPIRone (BUSPAR) 10 MG tablet TAKE ONE-HALF TABLET BY MOUTH TWICE A DAY FOR ANXIETY 20   Bandar Padron MD   aspirin 325 MG EC tablet TAKE 1 TABLET BY MOUTH EVERY DAY 20   Bandar Padron MD   gabapentin (NEURONTIN) 300 MG capsule TAKE ONE CAPSULE BY MOUTH TWICE A DAY FOR NEUROPATHY 12/8/19 3/7/20  Bandar Padron MD   enoxaparin (LOVENOX) 60 MG/0.6ML injection Inject 0.6 mLs into the skin 2 times daily 19   Julee Marshall MD   simvastatin (ZOCOR) 40 MG tablet Take 1 tablet by mouth nightly 19   Gilford Aris, APRN - CNP   metoprolol tartrate (LOPRESSOR) 25 MG tablet TAKE 1/2 TABLET BY MOUTH TWICE A DAY FOR HIGH BLOOD PRESSURE 19   Gilford Aris, APRN - CNP   potassium chloride (KLOR-CON M10) 10 MEQ extended release tablet Take 1 tablet by mouth daily 19   Julee Marshall MD   warfarin (COUMADIN) 2 MG tablet TAKE 1 TABLET BY MOUTH DAILY 19   Julee Marshall MD   hydrochlorothiazide (HYDRODIURIL) 25 MG tablet TAKE 1 TABLET BY MOUTH DAILY 4/10/19   Julee Marshall MD   MINIVELLE 0.05 MG/24HR  16   Historical Provider, MD   fluticasone (FLONASE) 50 MCG/ACT nasal spray 2 sprays by Nasal route daily. Patient taking differently: 2 sprays by Nasal route as needed  3/17/15   Bandar Padron MD   meclizine (ANTIVERT) 25 MG tablet Take 1 tablet by mouth 3 times daily as needed for Dizziness.   Patient taking differently: Take 25 mg by mouth as needed for Dizziness  2/9/15   Breezy Koch MD albuterol (PROVENTIL HFA;VENTOLIN HFA) 108 (90 BASE) MCG/ACT inhaler Inhale 2 puffs into the lungs every 6 hours as needed for Wheezing. 4/17/14   CESAR Flower - CNP       Vitals: Stable     PHYSICAL EXAM:  HENT: Airway patent and reviewed  Cardiovascular: Normal rate, regular rhythm, normal heart sounds. Pulmonary/Chest: No wheezes. No rhonchi. No rales. Abdominal: Soft. Bowel sounds are normal. No distension. Mallampati: 2      MALLAMPATI:           []   I. Complete visualization of the soft palate           [x]   II. Complete visualization of the uvula            []   III. Visualization of only the base of the uvula           []   IV. Soft palate is not visible     ASA CLASS:         []   I. Normal, healthy adult           [x]   II.  Mild systemic disease            []   III. Severe systemic disease      Sedation plan:   [x]  Local              []  Minimal                  []  General anesthesia    Patient's condition acceptable for planned procedure/sedation. Post Procedure Plan   Return to same level of care   ______________________     The risks and benefits as well as alternatives to the procedure have been discussed with the patient and or family. The patient and or next of kin understands and agrees to proceed.     Mari Rodarte M.D.

## 2020-01-28 NOTE — OP NOTE
Patient:  Justin Mosqueda Record #:  [id number]   Date:  1/28/2020  Physician:  Haylie Peace M.D. Facility: [address] [place] [address]     Pre-op diagnosis:  Lumbar facet pain syndrome, facet arthropathy, lumbar spondylosis  Post-op diagnosis:  same  Procedure: Bilateral L3, L4, and L5 medial branch blocks with fluoroscopic guidance #2  Anesthesia: none  Procedure Note:    The patient was admitted through pre-op and written consent was obtained. The patient was advised of the risks and benefits of the procedure, including but not limited to the following: bleeding, pain, infection, temporary paralysis, nerve damage and spinal headache. The patient was given the opportunity to ask questions. There were no contraindications for this procedure. The appropriate area was prepped and draped in a sterile fashion. Landmarks were identified and marked. Three 25G spinal needle were directed to the right L3, L4, and L5 medial medial branches using fluoroscopic guidance with ideal needle tip confirmed by multiple views. There were no signs of intravascular or intrathecal injection. 0.5cc of 0.75% marcaine were injected at each site. There were no complications and the patient tolerated the procedure well. The patient was transferred to the recovery area and monitored. Discharge instructions were given. The patient is to contact me for any post-procedure concerns. The patient is to follow up as scheduled. The same procedure was repeated on the contralateral side. Pain diary was discussed and provided.     Estimated blood loss: none    BRITT Rose MD

## 2020-02-05 NOTE — PROGRESS NOTES
Follow up: SPINE    CHIEF COMPLAINT:    Chief Complaint   Patient presents with    Back Pain     fu after MBB#2         HISTORY OF PRESENT ILLNESS:                The patient is a 64 y.o. female here to follow up back pain and thoracic pain. She is last seen a year and a half ago in our clinic. She is gone through conservative care with multiple medicines and therapy. No relief. Pain worse when the weather changes and with activities. She is on blood thinners for aortic valve replacement    She follows up after bilateral L3-4-5 medial branch blocks #2 of 2.   She had diagnostic relief  100% the first 6 hours    Pain Assessment  Location of Pain: Back  Severity of Pain: 6  Quality of Pain: Aching  Duration of Pain: Persistent  Frequency of Pain: Constant  Aggravating Factors: Standing, Walking, Other (Comment)  Limiting Behavior: Yes  Relieving Factors: Rest  Result of Injury: No  Work-Related Injury: No  Are there other pain locations you wish to document?: No    This injection form is reviewed    Past/Current Treatment     PT: Yes with some relief , bracing, dry needling  Chiro: Yes  Injections: Agnostic lumbar MBB x1  Medications: NSAIDs, prednisone ×2, Tylenol, gabapentin, tramadol, Mobic, diclofenac, Robaxin  Surgery: History of ACDF 2005 with [facility]    Past Medical History: Medical history form was reviewed and scanned into the Media tab  Past Medical History:   Diagnosis Date    Anxiety     Anxiety     Aortic valve defect 2008    congenital,Ao replaced,Coronary angiogram= Normal    Depression     HTN (hypertension)     Hyperlipidemia     Hyperlipidemia     Hypertension     LONG TERM ANTICOAGULENT USE     Palpitations     Pneumonia     Psoriasis         REVIEW OF SYSTEMS:   CONSTITUTIONAL: Denies unexplained weight loss, fevers, chills or fatigue  NEUROLOGIC: Denies tremors or seizures         PHYSICAL EXAM:    Vitals: Height 5' 2.99\" (1.6 m), weight 132 lb 15 oz (60.3 kg), not currently breastfeeding. GENERAL EXAM:  · General Apparence: Patient is adequately groomed with no evidence of malnutrition. · Orientation: The patient is oriented to time, place and person. · Mood & Affect:The patient's mood and affect are appropriate   · Vascular: Examination reveals no swelling tenderness in upper or lower extremities. · Lymphatic: The lymphatic examination bilaterally reveals all areas to be without enlargement or induration  · Sensation: Sensation is intact without deficit  LUMBAR/SACRAL EXAMINATION:  · Inspection: Mild thoracic kyphosis  · Palpation:  Diffuse thoracic tenderness. Bilateral lower lumbar tenderness palpation   · range of Motion:  Mild to moderate loss  · Strength:   Strength testing is 5/5 in all muscle groups tested. · Special Tests:   Straight leg raise and crossed SLR negative. Leg length and pelvis level.  0 out of 5 Landon's signs. · Skin: There are no rashes, ulcerations or lesions. · Reflexes: Reflexes are symmetrically 2+ at the patellar and ankle tendons. Clonus absent bilaterally at the feet. · Gait & station: Normal unassisted  · Additional Examinations:   · RIGHT LOWER EXTREMITY: Inspection/examination of the right lower extremity does not show any tenderness, deformity or injury. Range of motion is full. There is no gross instability. There are no rashes, ulcerations or lesions. Strength and tone are normal.  · LEFT LOWER EXTREMITY:  Inspection/examination of the left lower extremity does not show any tenderness, deformity or injury. Range of motion is full. There is no gross instability. There are no rashes, ulcerations or lesions. Strength and tone are normal.    Diagnostic Testing:   February 2, 2018  EXAMINATION:   CT OF THE THORACIC SPINE WITHOUT CONTRAST  2/2/2018 3:10 pm:       TECHNIQUE:   CT of the thoracic spine was performed without the administration of   intravenous contrast. Multiplanar reformatted images are provided for review.    Dose modulation, iterative reconstruction, and/or weight based adjustment of   the mA/kV was utilized to reduce the radiation dose to as low as reasonably   achievable.       COMPARISON:   Thoracic spine radiographs 01/23/2018.  PA lateral chest 01/23/2018.  CT   abdomen and pelvis 05/25/2016.  CT chest 09/03/2008.       HISTORY:   ORDERING PHYSICIAN PROVIDED HISTORY: Acute low back pain without sciatica,   unspecified back pain laterality   TECHNOLOGIST PROVIDED HISTORY:   Technologist Provided Reason for Exam: back pain x's one month; hx of   discetomy in c spine       FINDINGS:   BONES/ALIGNMENT: There is normal alignment of the spine. The vertebral body   heights are maintained. No osseous destructive lesion is seen.       DEGENERATIVE CHANGES: No gross spinal canal stenosis or bony neural foraminal   narrowing of the thoracic spine.  Multilevel disc space narrowing with   anterior osteophytes present.       SOFT TISSUES: No paraspinal mass is seen.           Impression   No evidence of acute or chronic compression fracture          EXAMINATION:   CT OF THE LUMBAR SPINE WITHOUT CONTRAST  2/2/2018       TECHNIQUE:   CT of the lumbar spine was performed without the administration of   intravenous contrast. Multiplanar reformatted images are provided for review. Dose modulation, iterative reconstruction, and/or weight based adjustment of   the mA/kV was utilized to reduce the radiation dose to as low as reasonably   achievable.       COMPARISON:   CT abdomen and pelvis 05/25/2016.       HISTORY:   ORDERING PHYSICIAN PROVIDED HISTORY: Acute low back pain without sciatica,   unspecified back pain laterality   TECHNOLOGIST PROVIDED HISTORY:   Technologist Provided Reason for Exam: back pain x's 1 month       FINDINGS:   BONES/ALIGNMENT: There is normal alignment of the spine. The vertebral body   heights are maintained.  No osseous destructive lesion is seen.  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

## 2020-02-13 NOTE — TELEPHONE ENCOUNTER
DOS   02/24/2020  CPT   79075  [id number]  DX   M47.816  OP SX AUTH  NPR    BILATERAL  LEVELS   L3  L4  L5   PROCEDURE   [facility]  DR. Williams CHAND  INSURANCE:   [facility]

## 2020-02-20 NOTE — PATIENT INSTRUCTIONS
Plan:  Smoking cessation discussed   Continue current medications   Check blood pressure at home weekly  Regular exercise and following a healthy diet encouraged   Follow up with me in 1 year

## 2020-02-20 NOTE — TELEPHONE ENCOUNTER
Please see monitor from 2019 under media tab. Pt was in office today. For some reason we never received this report. Called today to obtain EOS.  Please advise

## 2020-02-20 NOTE — H&P
HISTORY AND PHYSICAL/PRE-SEDATION ASSESSMENT    Patient:  Irineo Dinero   :   1960  Medical Record No.:  [id number]   Date:   2020  Physician:  Anibal Bray M.D. Facility: [facility]    Chief Complaint:  Chronic back pain      History of Present Illness: The patient is a 64 y.o. female who presents with chronic achy low back pain. Pain worsen with prolonged activity and extension. Relief with resting. History of diagnostic MBB. Denies any lower extremity radiating pain, no progressive numbness tingling weakness. Past Surgical History:    Past Surgical History:   Procedure Laterality Date    AORTIC VALVE REPLACEMENT       4 years ago    AORTIC VALVE REPLACEMENT      CARDIAC VALVE REPLACEMENT  2008    CERVICAL DISCECTOMY  2005    CERVICAL FUSION  2006    CERVICAL SPINE SURGERY Bilateral 2020    BILATERAL LUMBAR THREE, LUMBAR FOUR, LUMBAR FIVE MEDIAL BRANCH BLOCK SITE CONFIRMED BY FLUOROSCOPY performed by Anibal Bray MD at [address] [place] [address] Bilateral 2020    BILATERAL LUMBAR THREE, FOUR, FIVE MEDIAL BRANCH BLOCK SITE CONFIRMED BY FLUOROSCOPY performed by Anibal Bray MD at [address] [place] [address]  14    Tubular Adenoma. Internal Hemorrhoids. Redo 5 years.     HERNIA REPAIR      HYSTERECTOMY      BSO    HYSTERECTOMY, TOTAL ABDOMINAL      KNEE ARTHROSCOPY  12    right partial medial menisectomy    LAPAROSCOPY   &     endometriasis    NASAL SEPTUM SURGERY      NASAL SEPTUM SURGERY      VASCULAR SURGERY      descending artery repair       Past Medical History:  Past Medical History:   Diagnosis Date    Anxiety     Anxiety     Aortic valve defect 2008    congenital,Ao replaced,Coronary angiogram= Normal    Depression     HTN (hypertension)     Hyperlipidemia     Hyperlipidemia     Hypertension     LONG TERM ANTICOAGULENT USE     Palpitations     Pneumonia     Psoriasis        Allergies: No Known Allergies    Home Medications:    Prior to Admission medications    Medication Sig Start Date End Date Taking? Authorizing Provider   busPIRone (BUSPAR) 10 MG tablet TAKE ONE-HALF TABLET BY MOUTH TWICE A DAY FOR ANXIETY 1/22/20   Shereen Padron MD   aspirin 325 MG EC tablet TAKE 1 TABLET BY MOUTH EVERY DAY 1/2/20   Shereen Padron MD   gabapentin (NEURONTIN) 300 MG capsule TAKE ONE CAPSULE BY MOUTH TWICE A DAY FOR NEUROPATHY 12/8/19 3/7/20  Shereen Padron MD   enoxaparin (LOVENOX) 60 MG/0.6ML injection Inject 0.6 mLs into the skin 2 times daily 11/22/19   Froy Berman MD   simvastatin (ZOCOR) 40 MG tablet Take 1 tablet by mouth nightly 9/11/19   CESAR Prescott CNP   metoprolol tartrate (LOPRESSOR) 25 MG tablet TAKE 1/2 TABLET BY MOUTH TWICE A DAY FOR HIGH BLOOD PRESSURE 9/9/19   CESAR Prescott CNP   potassium chloride (KLOR-CON M10) 10 MEQ extended release tablet Take 1 tablet by mouth daily 8/23/19   Froy Berman MD   warfarin (COUMADIN) 2 MG tablet TAKE 1 TABLET BY MOUTH DAILY 7/25/19   Froy Berman MD   hydrochlorothiazide (HYDRODIURIL) 25 MG tablet TAKE 1 TABLET BY MOUTH DAILY 4/10/19   Froy Berman MD   MINIVELLE 0.05 MG/24HR  1/16/16   Historical Provider, MD   fluticasone (FLONASE) 50 MCG/ACT nasal spray 2 sprays by Nasal route daily. Patient taking differently: 2 sprays by Nasal route as needed  3/17/15   Shereen Padron MD   meclizine (ANTIVERT) 25 MG tablet Take 1 tablet by mouth 3 times daily as needed for Dizziness. Patient taking differently: Take 25 mg by mouth as needed for Dizziness  2/9/15   Ayush Winters MD   albuterol (PROVENTIL HFA;VENTOLIN HFA) 108 (90 BASE) MCG/ACT inhaler Inhale 2 puffs into the lungs every 6 hours as needed for Wheezing.  4/17/14   CESAR Lala CNP       Vitals: noted in chart    PHYSICAL EXAM:  HENT: Airway patent and reviewed  Cardiovascular: Normal rate, regular rhythm,

## 2020-02-20 NOTE — PROGRESS NOTES
mother. Home Medications:  Prior to Admission medications    Medication Sig Start Date End Date Taking? Authorizing Provider   busPIRone (BUSPAR) 10 MG tablet TAKE ONE-HALF TABLET BY MOUTH TWICE A DAY FOR ANXIETY 1/22/20  Yes Juliet Padron MD   aspirin 325 MG EC tablet TAKE 1 TABLET BY MOUTH EVERY DAY 1/2/20  Yes Juliet Padron MD   gabapentin (NEURONTIN) 300 MG capsule TAKE ONE CAPSULE BY MOUTH TWICE A DAY FOR NEUROPATHY 12/8/19 3/7/20 Yes Rubi Padron MD   enoxaparin (LOVENOX) 60 MG/0.6ML injection Inject 0.6 mLs into the skin 2 times daily 11/22/19  Yes Da Downs MD   simvastatin (ZOCOR) 40 MG tablet Take 1 tablet by mouth nightly 9/11/19  Yes CESAR Kelley CNP   metoprolol tartrate (LOPRESSOR) 25 MG tablet TAKE 1/2 TABLET BY MOUTH TWICE A DAY FOR HIGH BLOOD PRESSURE 9/9/19  Yes CESAR Kelley CNP   potassium chloride (KLOR-CON M10) 10 MEQ extended release tablet Take 1 tablet by mouth daily 8/23/19  Yes Da Downs MD   hydrochlorothiazide (HYDRODIURIL) 25 MG tablet TAKE 1 TABLET BY MOUTH DAILY 4/10/19  Yes Da oDwns MD   MINIVELLE 0.05 MG/24HR  1/16/16  Yes Historical Provider, MD   fluticasone (FLONASE) 50 MCG/ACT nasal spray 2 sprays by Nasal route daily. Patient taking differently: 2 sprays by Nasal route as needed  3/17/15  Yes Juliet Padron MD   meclizine (ANTIVERT) 25 MG tablet Take 1 tablet by mouth 3 times daily as needed for Dizziness. Patient taking differently: Take 25 mg by mouth as needed for Dizziness  2/9/15  Yes Danette Beckford MD   albuterol (PROVENTIL HFA;VENTOLIN HFA) 108 (90 BASE) MCG/ACT inhaler Inhale 2 puffs into the lungs every 6 hours as needed for Wheezing. 4/17/14  Yes CESAR Disla CNP   warfarin (COUMADIN) 2 MG tablet TAKE 1 TABLET BY MOUTH DAILY  Patient not taking: Reported on 2/20/2020 7/25/19   Da Downs MD        Allergies:  Patient has no known allergies.      Review of Systems:   · Constitutional: there has been no unanticipated weight loss. There's been no change in energy level, sleep pattern, or activity level. · Eyes: No visual changes or diplopia. No scleral icterus. · ENT: No Headaches, hearing loss or vertigo. No mouth sores or sore throat. · Cardiovascular: Reviewed in HPI  · Respiratory: No cough or wheezing, no sputum production. No hematemesis. · Gastrointestinal: No abdominal pain, appetite loss, blood in stools. No change in bowel or bladder habits. · Genitourinary: No dysuria, trouble voiding, or hematuria. · Musculoskeletal:  No gait disturbance, weakness or joint complaints. · Integumentary: No rash or pruritis. · Neurological: No headache, diplopia, change in muscle strength, numbness or tingling. No change in gait, balance, coordination, mood, affect, memory, mentation, behavior. · Psychiatric: No anxiety, no depression. · Endocrine: No malaise, fatigue or temperature intolerance. No excessive thirst, fluid intake, or urination. No tremor. · Hematologic/Lymphatic: No abnormal bruising or bleeding, blood clots or swollen lymph nodes. · Allergic/Immunologic: No nasal congestion or hives. Physical Examination:    Vitals:    02/20/20 1012   BP: 112/62   Pulse: 64   SpO2: 97%        Constitutional and General Appearance: NAD   Respiratory:  · Normal excursion and expansion without use of accessory muscles  · Resp Auscultation: Normal breath sounds without dullness  Cardiovascular:  · The apical impulses not displaced  · Heart tones are crisp and normal  · Cervical veins are not engorged  · The carotid upstroke is normal in amplitude and contour without delay or bruit  · Normal S1S2, No S3, 3/6 systolic murmur, crisp mechanical heart sounds   · Peripheral pulses are symmetrical and full  · There is no clubbing, cyanosis of the extremities.   · No edema  · Femoral Arteries: 2+ and equal  · Pedal Pulses: 2+ and equal   Abdomen:  · No masses or tenderness  · Liver/Spleen: No Abnormalities Noted  Neurological/Psychiatric:  · Alert and oriented in all spheres  · Moves all extremities well  · Exhibits normal gait balance and coordination  · No abnormalities of mood, affect, memory, mentation, or behavior are noted    Echo 3/2016  Normal left ventricular systolic function with an estimated ejection  fraction of 55%. No regional wall motion abnormalities are seen. Elevated left ventricular diastolic filling pressure ( Septal E/e' 17). Mild thickening of mitral leaflets. Normally functioning mechanical prosthetic valve in aortic position. Doppler  parameters are normal for the valve. Holter 3/2015  PVC, PAC    Echocardiogram 3/2019   Summary   Normal left ventricle systolic function with an estimated ejection fraction   of 55%. No regional wall motion abnormalities are seen. Grade I diastolic dysfunction with normal filing pressure. Normally functioning mechanical valve in aortic position appears well   seated. Doppler parameters are normal for the valve . Mild aortic regurgitation. Mild mitral regurgitation. Systolic pulmonary artery pressure (SPAP) is normal and estimated at 27mmHg   (right atrial pressure 3 mmHg). Assessment:     1. H/O aortic valve replacement, mechanical, 2008. (prior bicuspid w/ AS). Stable. Echo reviewed. 2. HTN (hypertension) - controlled   3. Hyperlipidemia - controlled. Discussed results along w/ diet and exercise   4. Anticoagulant long-term use    5. Dizziness - doubt cardiac. No recent   6. Aortic valve disease    7. Palpitations - mild, stable   8. Smoking - cessation discussed  9. Palpitations - decreased. may be AF but rate not elev and already on [facility]. Unable to locate monitor results - will f/u w/ company. Plan:  Smoking cessation discussed   Continue current medications   Check blood pressure at home weekly  Regular exercise and following a healthy diet encouraged   Follow up with me in 1 year     Scribe's attestation:   This note was scribed in the presence of Dr. Millie Bautista M.D. By Alissa Boyer RN    The scribes documentation has been prepared under my direction and personally reviewed by me in its entirety. I confirm that the note above accurately reflects all work, treatment, procedures, and medical decision making performed by me. MD Grant Reyes.  Clare Baker M.D., Andreea Camp

## 2020-02-24 NOTE — OP NOTE
Patient:  Dimitris [place] Record #:  [id number]   Date:  2/24/2020  Physician:  Joan Shell M.D. Facility: [facility]     Pre-op diagnosis: Lumbar spondylosis, Facet pain syndrome, facet arthropathy, facet synovitis  Post-op diagnosis:  same  Procedure: Bilateral L3, L4, L5 radiofrequency neurotomy   Anesthesia: Conscious sedation with 2mg Versed and 50 µg fentanyl     Procedure Note:  Risk and benefits were explained. Informed consent was obtained. The patient was placed in the prone position and prepped in the normal sterile manner with Betadine. The superficial skin was anesthetized with 1% Lidocaine. With fluoroscopic guidance, two individual 20 gauge curved tipped radiofrequency needles were directed at the junctions of the left L4 and L5 transverse processes and superior articulating processes. A third needle was then directed to the left junction of the superior articulating process and the sacral ala of S1.  Multiplane imaging confirmed appropriate placement. Lateral view confirmed the needle tips were posterior and inferior  To the neural foramen. Motor stimulation at 2 hertz at 2 volt produced no contractions in the left leg. Each site was then anesthetized with .5 cc 1% Lidocaine. Then radiofrequency lesioning was performed for 60 seconds at 90 degrees Celsius at each level. The patient tolerated the procedure well. Discharge instructions were given. Follow up is arranged. The same procedure was repeated on the contralateral side.      Estimated blood loss: none    BRITT Lees M.D.

## 2020-03-16 NOTE — PROGRESS NOTES
Testing:   February 2, 2018  EXAMINATION:   CT OF THE THORACIC SPINE WITHOUT CONTRAST  2/2/2018 3:10 pm:       TECHNIQUE:   CT of the thoracic spine was performed without the administration of   intravenous contrast. Multiplanar reformatted images are provided for review. Dose modulation, iterative reconstruction, and/or weight based adjustment of   the mA/kV was utilized to reduce the radiation dose to as low as reasonably   achievable.       COMPARISON:   Thoracic spine radiographs 01/23/2018.  PA lateral chest 01/23/2018.  CT   abdomen and pelvis 05/25/2016.  CT chest 09/03/2008.       HISTORY:   ORDERING PHYSICIAN PROVIDED HISTORY: Acute low back pain without sciatica,   unspecified back pain laterality   TECHNOLOGIST PROVIDED HISTORY:   Technologist Provided Reason for Exam: back pain x's one month; hx of   discetomy in c spine       FINDINGS:   BONES/ALIGNMENT: There is normal alignment of the spine. The vertebral body   heights are maintained. No osseous destructive lesion is seen.       DEGENERATIVE CHANGES: No gross spinal canal stenosis or bony neural foraminal   narrowing of the thoracic spine.  Multilevel disc space narrowing with   anterior osteophytes present.       SOFT TISSUES: No paraspinal mass is seen.           Impression   No evidence of acute or chronic compression fracture          EXAMINATION:   CT OF THE LUMBAR SPINE WITHOUT CONTRAST  2/2/2018       TECHNIQUE:   CT of the lumbar spine was performed without the administration of   intravenous contrast. Multiplanar reformatted images are provided for review.    Dose modulation, iterative reconstruction, and/or weight based adjustment of   the mA/kV was utilized to reduce the radiation dose to as low as reasonably   achievable.       COMPARISON:   CT abdomen and pelvis 05/25/2016.       HISTORY:   ORDERING PHYSICIAN PROVIDED HISTORY: Acute low back pain without sciatica,   unspecified back pain laterality   TECHNOLOGIST PROVIDED HISTORY:

## 2020-07-31 NOTE — TELEPHONE ENCOUNTER
Has a headache that has been going on for 2 days now. The top of her head hurts. She has been taking tylenol, but does not seem to help. Reason for Disposition   Unexplained headache that is present > 24 hours    Answer Assessment - Initial Assessment Questions  1. LOCATION: \"Where does it hurt? \"       Has a headache that has been going on for 2 days now. The top of her head hurts. She has been taking tylenol, but does not seem to help. 2. ONSET: \"When did the headache start? \" (Minutes, hours or days)       2 days  3. PATTERN: \"Does the pain come and go, or has it been constant since it started? \"      Pretty much constant since it started. 4. SEVERITY: \"How bad is the pain? \" and \"What does it keep you from doing? \"  (e.g., Scale 1-10; mild, moderate, or severe)    - MILD (1-3): doesn't interfere with normal activities     - MODERATE (4-7): interferes with normal activities or awakens from sleep     - SEVERE (8-10): excruciating pain, unable to do any normal activities         Verbalized it is not her worst. She has had migraines, it is not a migrain    5. RECURRENT SYMPTOM: \"Have you ever had headaches before? \" If so, ask: \"When was the last time? \" and \"What happened that time? \"       She does not know    6. CAUSE: \"What do you think is causing the headache? \"      No idea    7. MIGRAINE: \"Have you been diagnosed with migraine headaches? \" If so, ask: \"Is this headache similar? \"       Hx migraine, does not feel like it. 8. HEAD INJURY: \"Has there been any recent injury to the head? \"       Denies    9. OTHER SYMPTOMS: \"Do you have any other symptoms? \" (fever, stiff neck, eye pain, sore throat, cold symptoms)      Stiff neck yesterday, but not today, stuffy nose, small amount of clear mucus from nose. She is still able to breath through her nose. 10. PREGNANCY: \"Is there any chance you are pregnant? \" \"When was your last menstrual period? \"       No    Protocols used: HEADACHE-ADULT-[place]    Pod 1    Is able to do a virtual visit. Due to time of day on a Friday may not be able to get her an appointment. Informed her if her headache gets worse or not able to get an appointment she could go to urgent care. Received call from [address]. Call soft transferred to [address] to schedule appointment. Please do not reply to the triage nurse through this encounter. Any subsequent communication should be directly with the patient.

## 2020-08-05 NOTE — PROGRESS NOTES
25 MG tablet TAKE 1/2 TABLET BY MOUTH TWICE A DAY FOR HIGH BLOOD PRESSURE Yes CESAR Nicholson CNP   potassium chloride (KLOR-CON M10) 10 MEQ extended release tablet Take 1 tablet by mouth daily Yes Renée Granado MD   warfarin (COUMADIN) 2 MG tablet TAKE 1 TABLET BY MOUTH DAILY Yes Renée Granado MD   MINIVELLE 0.05 MG/24HR  Yes Historical Provider, MD   fluticasone (FLONASE) 50 MCG/ACT nasal spray 2 sprays by Nasal route daily. Patient taking differently: 2 sprays by Nasal route as needed  Yes Sallie Padron MD   albuterol (PROVENTIL HFA;VENTOLIN HFA) 108 (90 BASE) MCG/ACT inhaler Inhale 2 puffs into the lungs every 6 hours as needed for Wheezing.  Yes CESAR Edgar CNP   butalbital-acetaminophen-caffeine (FIORICET, ESGIC) -40 MG per tablet Take 1 tablet by mouth every 6 hours as needed for Headaches  Sallie Padron MD   enoxaparin (LOVENOX) 60 MG/0.6ML injection Inject 0.6 mLs into the skin 2 times daily  Patient not taking: Reported on 8/4/2020  Renée Granado MD       Social History     Tobacco Use    Smoking status: Current Every Day Smoker     Packs/day: 1.00     Years: 20.00     Pack years: 20.00    Smokeless tobacco: Never Used   Substance Use Topics    Alcohol use: Yes     Comment: 2 x months    Drug use: No        No Known Allergies,   Past Medical History:   Diagnosis Date    Anxiety     Anxiety     Aortic valve defect 2008    congenital,Ao replaced,Coronary angiogram= Normal    Depression     HTN (hypertension)     Hyperlipidemia     Hyperlipidemia     Hypertension     LONG TERM ANTICOAGULENT USE     Palpitations     Pneumonia     Psoriasis    ,   Past Surgical History:   Procedure Laterality Date    AORTIC VALVE REPLACEMENT       4 years ago    AORTIC VALVE REPLACEMENT      CARDIAC VALVE REPLACEMENT  9/25/2008    CERVICAL DISCECTOMY  2005    CERVICAL FUSION  2006    CERVICAL SPINE SURGERY Bilateral 1/7/2020    BILATERAL LUMBAR THREE, LUMBAR FOUR, LUMBAR FIVE MEDIAL BRANCH BLOCK SITE CONFIRMED BY FLUOROSCOPY performed by César Huber MD at [address] [facility] Bilateral 1/28/2020    BILATERAL LUMBAR THREE, FOUR, FIVE MEDIAL BRANCH BLOCK SITE CONFIRMED BY FLUOROSCOPY [facility] [address] [facility]  5/8/14    Tubular Adenoma. Internal Hemorrhoids. Redo 5 years.     HERNIA REPAIR      HYSTERECTOMY  1998    BSO    HYSTERECTOMY, TOTAL ABDOMINAL      KNEE ARTHROSCOPY  9/25/12    right partial medial menisectomy    LAPAROSCOPY  1997 & 1994    endometriasis    NASAL SEPTUM SURGERY  2005    NASAL SEPTUM SURGERY      NERVE SURGERY Bilateral 2/24/2020    BILATERAL LUMBAR THREE, FOUR, FIVE RADIOFREQUENCY ABLATION SITE CONFIRMED BY FLUOROSCOPY performed by César Huber MD at [facility]      descending artery repair   ,   Social History     Tobacco Use    Smoking status: Current Every Day Smoker     Packs/day: 1.00     Years: 20.00     Pack years: 20.00    Smokeless tobacco: Never Used   Substance Use Topics    Alcohol use: Yes     Comment: 2 x months    Drug use: No   ,   Family History   Problem Relation Age of Onset    Stroke Mother     Pacemaker Mother     Stroke Father     Heart Disease Father 61        MI    High Blood Pressure Father    ,   Immunization History   Administered Date(s) Administered    Influenza 10/19/2012, 10/17/2013    Influenza Virus Vaccine 11/19/1999, 10/27/2005, 10/30/2006, 10/24/2008, 09/30/2009, 09/14/2010, 09/01/2011, 10/30/2014, 11/27/2015    Influenza, Quadv, IM, (6 mo and older Fluzone, Flulaval, Fluarix and 3 yrs and older Afluria) 11/10/2016, 11/10/2017    Influenza, Quadv, IM, PF (6 mo and older Fluzone, Flulaval, Fluarix, and 3 yrs and older Afluria) 10/05/2018, 11/18/2019    Pneumococcal Polysaccharide ([id number]) 11/01/2008    Td (Adult), 2 Lf Tetanus Toxoid, Pf (Td, Absorbed) 11/18/2019    Td, unspecified formulation 10/01/1995    Tdap (Boostrix, Adacel) 03/16/2009   ,   Health Maintenance   Topic Date Due    Hepatitis C screen  1960    HIV screen  04/24/1977    Shingles Vaccine (1 of 2) 04/24/2012    Breast cancer screen  09/28/2019    Flu vaccine (1) 09/01/2020    Lipid screen  11/14/2020    Potassium monitoring  11/14/2020    Creatinine monitoring  11/14/2020    Colon cancer screen colonoscopy  05/08/2024    DTaP/Tdap/Td vaccine (3 - Td) 11/18/2029    Pneumococcal 0-64 years Vaccine  Completed    Hepatitis A vaccine  Aged Out    Hepatitis B vaccine  Aged Out    Hib vaccine  Aged Out    Meningococcal (ACWY) vaccine  Aged Out       PHYSICAL EXAMINATION:  [ INSTRUCTIONS:  \"[x]\" Indicates a positive item  \"[]\" Indicates a negative item  -- DELETE ALL ITEMS NOT EXAMINED]  Vital Signs: (As obtained by patient/caregiver or practitioner observation)    Blood pressure-  Heart rate-    Respiratory rate-    Temperature-  Pulse oximetry-     Constitutional: [x] Appears well-developed and well-nourished [x] No apparent distress      [] Abnormal-   Mental status  [x] Alert and awake  [x] Oriented to person/place/time [x]Able to follow commands      Eyes:  EOM    []  Normal  [] Abnormal-  Sclera  [x]  Normal  [] Abnormal -         Discharge [x]  None visible  [] Abnormal -    HENT:   [] Normocephalic, atraumatic.   [] Abnormal   [] Mouth/Throat: Mucous membranes are moist.     External Ears [] Normal  [] Abnormal-     Neck: [x] No visualized mass     Pulmonary/Chest: [x] Respiratory effort normal.  [x] No visualized signs of difficulty breathing or respiratory distress        [] Abnormal-      Musculoskeletal:   [] Normal gait with no signs of ataxia         [x] Normal range of motion of neck        [] Abnormal-       Neurological:        [x] No Facial Asymmetry (Cranial nerve 7 motor function) (limited exam to video visit)          [x] No gaze palsy        [] Abnormal-         Skin:        [x] No significant exanthematous lesions or discoloration noted on facial skin         [] Abnormal-            Psychiatric:       [x] Normal Affect [x] No Hallucinations        [] Abnormal-     Other pertinent observable physical exam findings-     ASSESSMENT/PLAN:  1. Acute nonintractable headache, unspecified headache type  Patient relates history of migraine headaches although this headache appears to be not \"as bad\". Sees it at urgent care as noted above treatment without improvement. Empirically try Esgic. Call if no improvement    2. Essential hypertension  Continue to monitor. 3. Hyperlipidemia, unspecified hyperlipidemia type  Continue to monitor. 4. H/O aortic valve replacement  Baseline. 5. Anxiety  Stable. 6. Major depressive disorder with single episode, in full remission ([address]  Stable. Continue present medicines    call if no improvement. Return follow-up  Dr. schultz    Return for Keep usual appointment. Edith Sales is a 64 y.o. female being evaluated by a Virtual Visit (video visit) encounter to address concerns as mentioned above. A caregiver was present when appropriate. Due to this being a TeleHealth encounter (During ELOIM-07 public health emergency), evaluation of the following organ systems was limited: Vitals/Constitutional/EENT/Resp/CV/GI//MS/Neuro/Skin/Heme-Lymph-Imm. Pursuant to the emergency declaration under the [address] [place] [address], [address] [place] [address] authority and the [employer] and [employer] Act, this Virtual Visit was conducted with patient's (and/or legal guardian's) consent, to reduce the patient's risk of exposure to COVID-19 and provide necessary medical care. The patient (and/or legal guardian) has also been advised to contact this office for worsening conditions or problems, and seek emergency medical treatment and/or call 911 if deemed necessary.      Patient identification was verified at the start

## 2020-08-10 NOTE — PROGRESS NOTES
8/10/2020    TELEHEALTH EVALUATION -- Audio/Visual (During GZVUL-50 public health emergency)    HPI:    Sonido Fernandez (:  1960) has requested an audio/video evaluation for the following concern(s):    Headache:  Patient complains of a 7 day history of headache, which has been continuous. Headache is described as throbbing, dull, aching and squeezing, and is usually not preceded by an aura. Localization: holocranial.  On average, pain is perceived as severe (6-8 pain scale). Associated symptoms include: photophobia, weakness- generalized and fatigue. She denies any other symptoms, paresthesias, weakness, change in speech, cognitive/personality change, vertigo, ataxia, stiff neck/neck pain, myalgias, upper respiratory symptoms, conjunctival injection, lacrimation, facial sweating, ptosis, periorbital edema. Daily activities are affected by the headaches. Headaches are usually worse during the day. Symptoms have been stable over time. Precipitating factors include nothing in particular. Prior history of similar symptoms: no.  Abortive treatment has included NSAID, acetaminophen, analgesic with butalbital and caffeine. Preventive treatment: beta blocker. Medication side effects: none. Pertinent past medical history: migraine headaches. Relevant family history includes no known family members with significant headaches. Prior evaulation: no prior evaluation. Review of Systems   Constitutional: Negative for activity change, fatigue and unexpected weight change. Respiratory: Negative for chest tightness and shortness of breath. Cardiovascular: Negative for chest pain, palpitations and leg swelling. Gastrointestinal: Negative for abdominal distention, constipation, diarrhea, nausea and vomiting. Genitourinary: Negative for difficulty urinating and urgency. Skin: Negative for rash. Neurological: Positive for headaches.  Negative for dizziness, tremors, seizures, facial asymmetry, tobacco: Never Used   Substance Use Topics    Alcohol use: Yes     Comment: 2 x months    Drug use: No        No Known Allergies,   Past Medical History:   Diagnosis Date    Anxiety     Anxiety     Aortic valve defect 2008    congenital,Ao replaced,Coronary angiogram= Normal    Depression     HTN (hypertension)     Hyperlipidemia     Hyperlipidemia     Hypertension     LONG TERM ANTICOAGULENT USE     Palpitations     Pneumonia     Psoriasis    ,   Past Surgical History:   Procedure Laterality Date    AORTIC VALVE REPLACEMENT       4 years ago    AORTIC VALVE REPLACEMENT      CARDIAC VALVE REPLACEMENT  9/25/2008    CERVICAL DISCECTOMY  2005    CERVICAL FUSION  2006    CERVICAL SPINE SURGERY Bilateral 1/7/2020    BILATERAL LUMBAR THREE, LUMBAR FOUR, LUMBAR FIVE MEDIAL BRANCH BLOCK SITE CONFIRMED BY FLUOROSCOPY performed by Selin Chu MD at [address] [facility] Bilateral 1/28/2020    BILATERAL LUMBAR THREE, FOUR, FIVE MEDIAL BRANCH BLOCK SITE CONFIRMED BY FLUOROSCOPY performed by Selin Chu MD at [address] COLONOSCOPY  5/8/14    Tubular Adenoma. Internal Hemorrhoids. Redo 5 years.     HERNIA REPAIR      HYSTERECTOMY  1998    BSO    HYSTERECTOMY, TOTAL ABDOMINAL      KNEE ARTHROSCOPY  9/25/12    right partial medial menisectomy    LAPAROSCOPY  1997 & 1994    endometriasis    NASAL SEPTUM SURGERY  2005    NASAL SEPTUM SURGERY      NERVE SURGERY Bilateral 2/24/2020    BILATERAL LUMBAR THREE, FOUR, FIVE RADIOFREQUENCY ABLATION SITE CONFIRMED BY FLUOROSCOPY performed by Selin Chu MD at [address] [facility]      descending artery repair   ,   Social History     Tobacco Use    Smoking status: Current Every Day Smoker     Packs/day: 1.00     Years: 20.00     Pack years: 20.00    Smokeless tobacco: Never Used   Substance Use Topics    Alcohol use: Yes     Comment: 2 x months    Drug use: No   ,   Family History Problem Relation Age of Onset    Stroke Mother     Pacemaker Mother     Stroke Father     Heart Disease Father 61        MI    High Blood Pressure Father    ,   Immunization History   Administered Date(s) Administered    Influenza 10/19/2012, 10/17/2013    Influenza Virus Vaccine 11/19/1999, 10/27/2005, 10/30/2006, 10/24/2008, 09/30/2009, 09/14/2010, 09/01/2011, 10/30/2014, 11/27/2015    Influenza, Honorio Danae, IM, (6 mo and older Fluzone, Flulaval, Fluarix and 3 yrs and older Afluria) 11/10/2016, 11/10/2017    Influenza, Quadv, IM, PF (6 mo and older Fluzone, Flulaval, Fluarix, and 3 yrs and older Afluria) 10/05/2018, 11/18/2019    Pneumococcal Polysaccharide ([id number]) 11/01/2008    Td (Adult), 2 Lf Tetanus Toxoid, Pf (Td, Absorbed) 11/18/2019    Td, unspecified formulation 10/01/1995    Tdap (Boostrix, Adacel) 03/16/2009   ,   Health Maintenance   Topic Date Due    Hepatitis C screen  1960    HIV screen  04/24/1977    Shingles Vaccine (1 of 2) 04/24/2012    Breast cancer screen  09/28/2019    Flu vaccine (1) 09/01/2020    Lipid screen  11/14/2020    Potassium monitoring  11/14/2020    Creatinine monitoring  11/14/2020    Colon cancer screen colonoscopy  05/08/2024    DTaP/Tdap/Td vaccine (3 - Td) 11/18/2029    Pneumococcal 0-64 years Vaccine  Completed    Hepatitis A vaccine  Aged Out    Hepatitis B vaccine  Aged Out    Hib vaccine  Aged Out    Meningococcal (ACWY) vaccine  Aged Out         PHYSICAL EXAMINATION:  [ INSTRUCTIONS:  \"[x]\" Indicates a positive item  \"[]\" Indicates a negative item  -- DELETE ALL ITEMS NOT EXAMINED]  Vital Signs: (As obtained by patient/caregiver or practitioner observation)    Blood pressure-  Heart rate-    Respiratory rate-    Temperature-  Pulse oximetry-     Constitutional: [x] Appears well-developed and well-nourished [x] No apparent distress      [] Abnormal-   Mental status  [x] Alert and awake  [x] Oriented to person/place/time [x]Able to follow commands      Eyes:  EOM    [x]  Normal  [] Abnormal-  Sclera  [x]  Normal  [] Abnormal -         Discharge [x]  None visible  [] Abnormal -    HENT:   [x] Normocephalic, atraumatic. [] Abnormal   [x] Mouth/Throat: Mucous membranes are moist.     External Ears [x] Normal  [] Abnormal-     Neck: [x] No visualized mass     Pulmonary/Chest: [x] Respiratory effort normal.  [x] No visualized signs of difficulty breathing or respiratory distress        [] Abnormal-      Musculoskeletal:   [x] Normal gait with no signs of ataxia         [x] Normal range of motion of neck        [] Abnormal-       Neurological:        [x] No Facial Asymmetry (Cranial nerve 7 motor function) (limited exam to video visit)          [] No gaze palsy        [] Abnormal-         Skin:        [x] No significant exanthematous lesions or discoloration noted on facial skin         [] Abnormal-            Psychiatric:       [x] Normal Affect [x] No Hallucinations        [] Abnormal-     Other pertinent observable physical exam findings-       ASSESSMENT/PLAN:    1. Acute intractable headache, unspecified headache type        Barney Mcclure was seen today for headache. Diagnoses and all orders for this visit:    Acute intractable headache, unspecified headache type  -     SUMAtriptan (IMITREX) 50 MG tablet; Take 1 tablet by mouth once as needed for Migraine  -     CT HEAD WO CONTRAST; Future    Will trial imitrex to relieve symptoms as nothing has helped thus far. Recommend CT of brain/head to evaluate for ICH given as she is anticoagulated. Consider structural abnormality as well. No recent stated trauma. Avoid NSAIDs for the time being given coumadin use. Consider neurology referral depending on results of triptan/results of CT. Return if symptoms worsen or fail to improve. Justin Gannon is a 64 y.o. female being evaluated by a Virtual Visit (video visit) encounter to address concerns as mentioned above.   A caregiver was present when

## 2020-08-13 NOTE — TELEPHONE ENCOUNTER
Patient received the results of CT but can't get in with neurologist until September 8, what is she to do in the meantime regarding the constant headache.  She feels that is a long time to have to wait

## 2020-08-13 NOTE — TELEPHONE ENCOUNTER
Refill request for butalbital medication.      Name of Pharmacy- [facility]    Last visit - 8-10-20     Pending visit - none    Last refill -8-4-20    Medication Contract signed -   Aime cox-    Additional Comments

## 2020-08-17 NOTE — ED PROVIDER NOTES
[facility]  ED  EMERGENCY DEPARTMENT ENCOUNTER      Pt Name: Evon Chandler  MRN: [id number]  [place] 1960  Date of evaluation: 8/17/2020  Provider:Ingrid Richardson,   ED Attending: Luciana Rojas MD    CHIEF COMPLAINT       Chief Complaint   Patient presents with    Migraine     seen thursday for same. . was to get in to see neuro. . was given script for imitrex. . pt states not helping. Leanna Huggins HISTORY OF PRESENT ILLNESS   (Location/Symptom, Timing/Onset,Context/Setting, Quality, Duration, Modifying Factors, Severity)  Note limiting factors. Evon Chandler is a 64 y.o. female who presents to the emergency department for evaluation of intractable headache. Has been occurring for >2 weeks. Headache has been constant and is localized to the top of the head. Currently rates her headache at 7/10. Not worse headache of her life. She reports that this is different from other headaches due to the length of time of headache but is unable to qualify how this is different from other episodes. Denies any visual changes, dizziness, lightheadedness. Has history of migraines but has never been on regular medications. She reportedly went to an urgent care center about 2 weeks ago for same issue and received an unknown injection (appears to have been Toradol per PCP note) with no improvement. She had a telemedicine visit with her PCP on 8/4 and was started on Esgic which again has not been helping. She then had another follow-up with PCP's office on 8/10 and a CT head was ordered due to patient being on Warfarin as well as starting on Imitrex 50mg. CT was negative for any acute hemorrhage but had an age-indeterminate right frontal infarct. She reported that the Imitrex has not helped at all. She reports having some mild nausea but no episodes of emesis. Last ate last night but does not recall what she ate.  Denies any fevers, chills, chest pain, shortness of breath, abdominal pain, diarrhea, or constipation. Nursing Notes were reviewed. REVIEW OF SYSTEMS    (2-9 systems for level 4, 10 or more for level 5)     Review of Systems   Constitutional: Negative for chills and fever. HENT: Negative for congestion, ear pain, hearing loss, rhinorrhea and sneezing. Eyes: Positive for photophobia. Negative for pain and visual disturbance. Respiratory: Negative for cough. Cardiovascular: Negative for chest pain. Gastrointestinal: Negative for abdominal pain, constipation, diarrhea, nausea and vomiting. Musculoskeletal: Negative for neck pain and neck stiffness. Neurological: Positive for headaches. Negative for dizziness, speech difficulty, weakness, light-headedness and numbness. Except as noted above the remainder of the review of systems was reviewed and negative. PAST MEDICAL HISTORY     Past Medical History:   Diagnosis Date    Anxiety     Anxiety     Aortic valve defect 2008    congenital,Ao replaced,Coronary angiogram= Normal    Depression     HTN (hypertension)     Hyperlipidemia     Hyperlipidemia     Hypertension     LONG TERM ANTICOAGULENT USE     Palpitations     Pneumonia     Psoriasis          SURGICAL HISTORY       Past Surgical History:   Procedure Laterality Date    AORTIC VALVE REPLACEMENT       4 years ago    AORTIC VALVE REPLACEMENT      CARDIAC VALVE REPLACEMENT  9/25/2008    CERVICAL DISCECTOMY  2005    CERVICAL FUSION  2006    CERVICAL SPINE SURGERY Bilateral 1/7/2020    BILATERAL LUMBAR THREE, LUMBAR FOUR, LUMBAR FIVE MEDIAL BRANCH BLOCK SITE CONFIRMED BY FLUOROSCOPY performed by Angelic Chun MD at [facility] Bilateral 1/28/2020    BILATERAL LUMBAR THREE, FOUR, FIVE MEDIAL BRANCH BLOCK SITE CONFIRMED BY FLUOROSCOPY performed by Angelic Chun MD at [address] COLONOSCOPY  5/8/14    Tubular Adenoma. Internal Hemorrhoids. Redo 5 years.    [address] 53 HYSTERECTOMY  1998    BSO    HYSTERECTOMY, TOTAL ABDOMINAL      KNEE ARTHROSCOPY  9/25/12    right partial medial menisectomy    LAPAROSCOPY  1997 & 1994    endometriasis    NASAL SEPTUM SURGERY  2005    NASAL SEPTUM SURGERY      NERVE SURGERY Bilateral 2/24/2020    BILATERAL LUMBAR THREE, FOUR, FIVE RADIOFREQUENCY ABLATION SITE CONFIRMED BY FLUOROSCOPY performed by Opal Stanford MD at [address] [place] [address]      descending artery repair         CURRENT MEDICATIONS       Previous Medications    ALBUTEROL (PROVENTIL HFA;VENTOLIN HFA) 108 (90 BASE) MCG/ACT INHALER    Inhale 2 puffs into the lungs every 6 hours as needed for Wheezing. ASPIRIN 325 MG EC TABLET    TAKE 1 TABLET BY MOUTH EVERY DAY    BUSPIRONE (BUSPAR) 10 MG TABLET    TAKE ONE-HALF TABLET BY MOUTH TWICE A DAY FOR ANXIETY    BUTALBITAL-ACETAMINOPHEN-CAFFEINE (FIORICET, ESGIC) -40 MG PER TABLET    TAKE 1 TABLET BY MOUTH EVERY 6 HOURS AS NEEDED FOR HEADACHES    ENOXAPARIN (LOVENOX) 60 MG/0.6ML INJECTION    Inject 0.6 mLs into the skin 2 times daily    FLUTICASONE (FLONASE) 50 MCG/ACT NASAL SPRAY    2 sprays by Nasal route daily. GABAPENTIN (NEURONTIN) 300 MG CAPSULE    TAKE ONE CAPSULE BY MOUTH TWICE A DAY FOR NEUROPATHY    HYDROCHLOROTHIAZIDE (HYDRODIURIL) 25 MG TABLET    TAKE 1 TABLET BY MOUTH DAILY    METOPROLOL TARTRATE (LOPRESSOR) 25 MG TABLET    TAKE 1/2 TABLET BY MOUTH TWICE A DAY FOR HIGH BLOOD PRESSURE    MINIVELLE 0.05 MG/24HR        POTASSIUM CHLORIDE (KLOR-CON M10) 10 MEQ EXTENDED RELEASE TABLET    Take 1 tablet by mouth daily    SIMVASTATIN (ZOCOR) 40 MG TABLET    TAKE 1 TABLET BY MOUTH EVERY DAY AT NIGHT    SUMATRIPTAN (IMITREX) 50 MG TABLET    Take 1 tablet by mouth once as needed for Migraine    WARFARIN (COUMADIN) 2 MG TABLET    TAKE 1 TABLET BY MOUTH DAILY       ALLERGIES     Patient has no known allergies.     FAMILY HISTORY       Family History   Problem Relation Age of Onset    Stroke Mother     Pacemaker Mother     Stroke Father     Heart Disease Father 61        MI    High Blood Pressure Father           SOCIAL HISTORY       Social History     Socioeconomic History    Marital status:      Spouse name: None    Number of children: None    Years of education: None    Highest education level: None   Occupational History    None   Social Needs    Financial resource strain: None    Food insecurity     Worry: None     Inability: None    Transportation needs     Medical: None     Non-medical: None   Tobacco Use    Smoking status: Current Every Day Smoker     Packs/day: 1.00     Years: 20.00     Pack years: 20.00    Smokeless tobacco: Never Used   Substance and Sexual Activity    Alcohol use: Yes     Comment: 2 x months    Drug use: Never    Sexual activity: Yes     Partners: Male   Lifestyle    Physical activity     Days per week: None     Minutes per session: None    Stress: None   Relationships    Social connections     Talks on phone: None     Gets together: None     Attends Sikh service: None     Active member of club or organization: None     Attends meetings of clubs or organizations: None     Relationship status: None    Intimate partner violence     Fear of current or ex partner: None     Emotionally abused: None     Physically abused: None     Forced sexual activity: None   Other Topics Concern    None   Social History Narrative    None       SCREENINGS             PHYSICAL EXAM    (up to 7 for level 4, 8 ormore for level 5)     ED Triage Vitals [08/17/20 1205]   BP Temp Temp Source Pulse Resp SpO2 Height Weight   (!) 140/61 98 °F (36.7 °C) Oral 94 16 96 % 5' 3\" (1.6 m) 130 lb (59 kg)       General appearance:  Alert and oriented, no apparent distress, cooperative. Appears overall comfortable. HEENT:  Normal cephalic, atraumatic without obvious deformity. Pupils equal, round, and reactive to light. Extra ocular muscles intact. Conjunctivae/corneas clear.  Difficulty keeping eyes open with direct light.   Neck: Supple, with full range of motion. Respiratory:  Normal respiratory effort. Clear to auscultation, bilaterally without Rales/Wheezes/Rhonchi. Cardiovascular:  Regular rate and rhythm with normal S1/S2. Patient has a 3/6 holosystolic murmur, loudest at aortic post. No murmurs or rubs. Abdomen: Soft, non-tender, non-distended with normal bowel sounds. Musculoskeletal:  No clubbing, cyanosis or edema bilaterally. Full range of motion without deformity. Skin: Skin color, texture, turgor normal.  No rashes or lesions. Neurologic:  Neurovascularly intact without any focal sensory/motor deficits. No focal deficits. Psychiatric:  Alert and oriented, thought content appropriate, normal insight. Physical Exam    DIAGNOSTIC RESULTS     EKG: All EKG's are interpreted by the Emergency Department Physicianwho either signs or Co-signs this chart in the absence of a cardiologist.      RADIOLOGY:   Non-plain film images such as CT, Ultrasound and MRI are read by the radiologist. Plain radiographic images are visualized and preliminarily interpreted by the emergency physician with the below findings:      Interpretation per the Radiologist below, if available at the time of this note:    No orders to display       ED BEDSIDE ULTRASOUND:   Performed by ED Physician - none    LABS:  Labs Reviewed - No data to display    All other labs were within normal range ornot returned as of this dictation. EMERGENCY DEPARTMENT COURSE and DIFFERENTIAL DIAGNOSIS/MDM:   Vitals:    Vitals:    08/17/20 1205   BP: (!) 140/61   Pulse: 94   Resp: 16   Temp: 98 °F (36.7 °C)   TempSrc: Oral   SpO2: 96%   Weight: 130 lb (59 kg)   Height: 5' 3\" (1.6 m)         [facility]    ED COURSE/MDM    - River Buck is a 64 y.o. female with a history of ***  - Patient seen and evaluated. Oldrecords reviewed. Labs and imaging reviewed and results discussed with patient.  Workup was significant for ***  - Patient was given *** in the ED with good symptomatic relief. Patient was reassessed as noted above. - No acute pathology was noted and plan for discharge home with close follow up with PCP was discussed with patient and family. Strict ED return precautions given for new/worsening symptoms. Patient and family in agreement withplan, verbally confirm understanding and have no further questions/concerns. - Plan for admission for further workup and treatment discussed with patient and family. Patient and family in agreement with plan and have nofurther questions/concerns      REASSESSMENT          CRITICAL CARE TIME   Total Critical Care time was 0 minutes, excluding separately reportable procedures. There was a high probability of clinically significant/life threatening deterioration in the patient's condition which required my urgent intervention. CONSULTS:  None    PROCEDURES:  Unless otherwise noted below, none     Procedures    FINAL IMPRESSION    No diagnosis found. DISPOSITION/PLAN   DISPOSITION        PATIENT REFERREDTO:  No follow-up provider specified. DISCHARGE MEDICATIONS:  New Prescriptions    No medications on file          The patient was discussed and seen with attending provider. Anahi Benoit D.O.   [facility] Resident  PGY-3

## 2020-08-17 NOTE — ED NOTES

## 2020-08-17 NOTE — ED NOTES
[facility] called @ 1808  Re: Intractible headache x 2 weeks, Microaneurysm   Per Mosetta Evelyn  Dr. Beauford Shackleton called back @ [phone] [facility]  08/17/20 1503

## 2020-08-17 NOTE — ED PROVIDER NOTES
[address] [facility]  ED  EMERGENCY DEPARTMENT ENCOUNTER      Pt Name: Naga Fierro  MRN: [id number]  [place] 1960  Date of evaluation: 8/17/2020  Provider:Ingrid Moran DO  ED Attending: Leti Anderson MD    CHIEF COMPLAINT       Chief Complaint   Patient presents with    Migraine     seen thursday for same. . was to get in to see neuro. . was given script for imitrex. . pt states not helping. Raymond Hires HISTORY OF PRESENT ILLNESS   (Location/Symptom, Timing/Onset,Context/Setting, Quality, Duration, Modifying Factors, Severity)  Note limiting factors. Naga Fierro is a 64 y.o. female who presents to the emergency department for evaluation of intractable headache. Has been occurring for >2 weeks. Headache has been constant and is localized to the top of the head. Currently rates her headache at 7/10. Not worse headache of her life. She reports that this is different from other headaches due to the length of time of headache but is unable to qualify how this is different from other episodes. The headache is been unchanged from when it started about over 2 weeks ago. Denies any visual changes, dizziness, lightheadedness. Has history of migraines but has never been on regular medications. She reportedly went to an urgent care center about 2 weeks ago for same issue and received an unknown injection (appears to have been Toradol per PCP note) with no improvement. She had a telemedicine visit with her PCP on 8/4 and was started on Esgic which again has not been helping. She then had another follow-up with PCP's office on 8/10 and a CT head was ordered due to patient being on Warfarin as well as starting on Imitrex 50mg. CT head on 8/13 was negative for any acute hemorrhage but had an age-indeterminate right frontal infarct. She reported that the Imitrex has not helped at all. She reports having some mild nausea but no episodes of emesis. Last ate last night but does not recall what she ate.  Denies any fevers, chills, chest pain, shortness of breath, abdominal pain, diarrhea, or constipation. Nursing Notes were reviewed. REVIEW OF SYSTEMS    (2-9 systems for level 4, 10 or more for level 5)     Review of Systems   Constitutional: Negative for chills and fever. HENT: Negative for congestion, ear pain, hearing loss, rhinorrhea and sneezing. Eyes: Positive for photophobia. Negative for pain and visual disturbance. Respiratory: Negative for cough. Cardiovascular: Negative for chest pain. Gastrointestinal: Negative for abdominal pain, constipation, diarrhea, nausea and vomiting. Musculoskeletal: Negative for neck pain and neck stiffness. Neurological: Positive for headaches. Negative for dizziness, speech difficulty, weakness, light-headedness and numbness. Except as noted above the remainder of the review of systems was reviewed and negative. PAST MEDICAL HISTORY     Past Medical History:   Diagnosis Date    Anxiety     Anxiety     Aortic valve defect 2008    congenital,Ao replaced,Coronary angiogram= Normal    Depression     HTN (hypertension)     Hyperlipidemia     Hyperlipidemia     Hypertension     LONG TERM ANTICOAGULENT USE     Palpitations     Pneumonia     Psoriasis          SURGICAL HISTORY       Past Surgical History:   Procedure Laterality Date    AORTIC VALVE REPLACEMENT       4 years ago    AORTIC VALVE REPLACEMENT      CARDIAC VALVE REPLACEMENT  9/25/2008    CERVICAL DISCECTOMY  2005    CERVICAL FUSION  2006    CERVICAL SPINE SURGERY Bilateral 1/7/2020    BILATERAL LUMBAR THREE, LUMBAR FOUR, LUMBAR FIVE MEDIAL BRANCH BLOCK SITE CONFIRMED BY FLUOROSCOPY performed by Herrera Hawkins MD at [address] [facility] Bilateral 1/28/2020    BILATERAL LUMBAR THREE, FOUR, FIVE MEDIAL BRANCH BLOCK SITE CONFIRMED BY FLUOROSCOPY performed by Herrera Hawkins MD at [address] [facility]ONOSCOPY  5/8/14    Tubular Adenoma.  Internal Hemorrhoids. Redo 5 years.     HERNIA REPAIR      HYSTERECTOMY  1998    BSO    HYSTERECTOMY, TOTAL ABDOMINAL      KNEE ARTHROSCOPY  9/25/12    right partial medial menisectomy    LAPAROSCOPY  1997 & 1994    endometriasis    NASAL SEPTUM SURGERY  2005    NASAL SEPTUM SURGERY      NERVE SURGERY Bilateral 2/24/2020    BILATERAL LUMBAR THREE, FOUR, FIVE RADIOFREQUENCY ABLATION SITE CONFIRMED BY FLUOROSCOPY performed by Selin Chu MD at [address] [place] [address]      descending artery repair         CURRENT MEDICATIONS       Discharge Medication List as of 8/17/2020  7:09 PM      CONTINUE these medications which have NOT CHANGED    Details   butalbital-acetaminophen-caffeine (FIORICET, ESGIC) -40 MG per tablet TAKE 1 TABLET BY MOUTH EVERY 6 HOURS AS NEEDED FOR HEADACHES, Disp-20 tablet,R-0Normal      SUMAtriptan (IMITREX) 50 MG tablet Take 1 tablet by mouth once as needed for Migraine, Disp-9 tablet,R-0Normal      hydroCHLOROthiazide (HYDRODIURIL) 25 MG tablet TAKE 1 TABLET BY MOUTH DAILY, Disp-90 tablet,R-3Normal      simvastatin (ZOCOR) 40 MG tablet TAKE 1 TABLET BY MOUTH EVERY DAY AT NIGHT, Disp-90 tablet, R-3For high cholesterolNormal      busPIRone (BUSPAR) 10 MG tablet TAKE ONE-HALF TABLET BY MOUTH TWICE A DAY FOR ANXIETY, Disp-90 tablet, R-4Normal      aspirin 325 MG EC tablet TAKE 1 TABLET BY MOUTH EVERY DAY, Disp-90 tablet, R-3Normal      gabapentin (NEURONTIN) 300 MG capsule TAKE ONE CAPSULE BY MOUTH TWICE A DAY FOR NEUROPATHY, Disp-180 capsule,R-2Normal      enoxaparin (LOVENOX) 60 MG/0.6ML injection Inject 0.6 mLs into the skin 2 times daily, Disp-14 Syringe, R-0Normal      metoprolol tartrate (LOPRESSOR) 25 MG tablet TAKE 1/2 TABLET BY MOUTH TWICE A DAY FOR HIGH BLOOD PRESSURE, Disp-90 tablet, R-3Normal      potassium chloride (KLOR-CON M10) 10 MEQ extended release tablet Take 1 tablet by mouth daily, Disp-180 tablet, R-2Normal      warfarin (COUMADIN) 2 MG tablet TAKE 1 TABLET BY MOUTH DAILY, Disp-90 tablet, R-3Normal      MINIVELLE 0.05 MG/24HR R-11, [place]      fluticasone (FLONASE) 50 MCG/ACT nasal spray 2 sprays by Nasal route daily. , Disp-1 Bottle, R-0      albuterol (PROVENTIL HFA;VENTOLIN HFA) 108 (90 BASE) MCG/ACT inhaler Inhale 2 puffs into the lungs every 6 hours as needed for Wheezing., Disp-1 Inhaler, R-3             ALLERGIES     Patient has no known allergies.     FAMILY HISTORY       Family History   Problem Relation Age of Onset    Stroke Mother     Pacemaker Mother     Stroke Father     Heart Disease Father 61        MI    High Blood Pressure Father           SOCIAL HISTORY       Social History     Socioeconomic History    Marital status:      Spouse name: None    Number of children: None    Years of education: None    Highest education level: None   Occupational History    None   Social Needs    Financial resource strain: None    Food insecurity     Worry: None     Inability: None    Transportation needs     Medical: None     Non-medical: None   Tobacco Use    Smoking status: Current Every Day Smoker     Packs/day: 1.00     Years: 20.00     Pack years: 20.00    Smokeless tobacco: Never Used   Substance and Sexual Activity    Alcohol use: Yes     Comment: 2 x months    Drug use: Never    Sexual activity: Yes     Partners: Male   Lifestyle    Physical activity     Days per week: None     Minutes per session: None    Stress: None   Relationships    Social connections     Talks on phone: None     Gets together: None     Attends Anabaptism service: None     Active member of club or organization: None     Attends meetings of clubs or organizations: None     Relationship status: None    Intimate partner violence     Fear of current or ex partner: None     Emotionally abused: None     Physically abused: None     Forced sexual activity: None   Other Topics Concern    None   Social History Narrative    None       SCREENINGS             PHYSICAL EXAM    (up to 7 for level 4, 8 ormore for level 5)     ED Triage Vitals [08/17/20 1205]   BP Temp Temp Source Pulse Resp SpO2 Height Weight   (!) 140/61 98 °F (36.7 °C) Oral 94 16 96 % 5' 3\" (1.6 m) 130 lb (59 kg)       General appearance:  Alert and oriented, no apparent distress, cooperative. Appears overall comfortable. HEENT:  Normal cephalic, atraumatic without obvious deformity. Pupils equal, round, and reactive to light. Extra ocular muscles intact. Conjunctivae/corneas clear. Difficulty keeping eyes open with direct light. Neck: Supple, with full range of motion. Respiratory:  Normal respiratory effort. Clear to auscultation, bilaterally without Rales/Wheezes/Rhonchi. Cardiovascular:  Regular rate and rhythm with normal S1/S2. Patient has a 3/6 holosystolic murmur, loudest at aortic post. No murmurs or rubs. Abdomen: Soft, non-tender, non-distended with normal bowel sounds. Musculoskeletal:  No clubbing, cyanosis or edema bilaterally. Full range of motion without deformity. Skin: Skin color, texture, turgor normal.  No rashes or lesions. Neurologic:  Neurovascularly intact without any focal sensory/motor deficits. No focal deficits. Psychiatric:  Alert and oriented, thought content appropriate, normal insight. Physical Exam    DIAGNOSTIC RESULTS     EKG: All EKG's are interpreted by the Emergency Department Physicianwho either signs or Co-signs this chart in the absence of a cardiologist.      RADIOLOGY:   Non-plain film images such as CT, Ultrasound and MRI are read by the radiologist. Plain radiographic images are visualized and preliminarily interpreted by the emergency physician with the below findings:      Interpretation per the Radiologist below, if available at the time of this note:    CTA HEAD NECK W CONTRAST   Final Result   No flow limiting stenosis or large vessel occlusion visualized within the   head or neck. Right infraclinoid ICA microaneurysm.       Pulmonary emphysema. ED BEDSIDE ULTRASOUND:   Performed by ED Physician - none    LABS:  Labs Reviewed   CBC - Abnormal; Notable for the following components:       Result Value    RDW 16.0 (*)     Platelets 757 (*)     MPV 11.4 (*)     All other components within normal limits    Narrative:     Performed at:  [address], [facility] [employer]   Phone [phone]   BASIC METABOLIC PANEL W/ REFLEX TO MG FOR LOW K - Abnormal; Notable for the following components:    Glucose 104 (*)     All other components within normal limits    Narrative:     Performed at:  [address], [facility] [employer]   Phone [phone]   PROTIME-INR    Narrative:     Performed at:  [address], [facility] [employer]   Phone [phone]       All other labs were within normal range ornot returned as of this dictation. EMERGENCY DEPARTMENT COURSE and DIFFERENTIAL DIAGNOSIS/MDM:   Vitals:    Vitals:    08/17/20 1205 08/17/20 1513 08/17/20 1706   BP: (!) 140/61 (!) 127/48 (!) 127/48   Pulse: 94 85 78   Resp: 16 16 16   Temp: 98 °F (36.7 °C)     TempSrc: Oral     SpO2: 96%  96%   Weight: 130 lb (59 kg)     Height: 5' 3\" (1.6 m)           MDM    ED COURSE/MDM    - Kai Juan is a 64 y.o. female with a history of migraines presenting for intractable headache.  - Patient seen and evaluated. Old records reviewed. Patient complaining of headache that is different from previous episodes due to the length of time and reporting that is localized to the top of the head. Patient was given Reglan and Benadryl without any improvement of headache. Attending had a discussion with patient regarding a possible lumbar puncture to rule out a subarachnoid hemorrhage. But considering the chronicity of patient's current headache, decision was made to proceed with a CTA head and neck.   CTA of the head and neck was negative for any flow-limiting stenosis or large vessel occlusion visualized within the head or neck. There is a right infraclinoid ICA micro aneurysm measuring 2.8 mm. Neurosurgery was consulted. Discussed the micro aneurysm with Dr. Vianey Gordon who does not believe that the micro aneurysm is causing patient headache. Patient was reevaluated and denied any improvement in her headache. We rediscussed the possibility of performing a lumbar puncture to rule out a subarachnoid hemorrhage. Patient declined lumbar puncture due to having history of migraines beginning after previous lumbar puncture. Patient was extensively counseled that we cannot rule out a subarachnoid hemorrhage completely due to not performing a lumbar puncture. Patient still declined lumbar puncture and desired to be discharged to home. Patient reported multiple times that she wanted to leave due to feeling hungry. Labs and imaging reviewed and results discussed with patient and her . Patient will be given a referral to neurology for further evaluation of intractable headache. She was also recommended to follow-up with her PCP. Counseled patient to return to the ED if headache worsens, having visual changes, nausea, vomiting, or altered mental status.  - No acute pathology was noted and plan for discharge home with close follow up with PCP was discussed with patient and family. Strict ED return precautions given for new/worsening symptoms. Patient and family in agreement withplan, verbally confirm understanding and have no further questions/concerns. REASSESSMENT          CRITICAL CARE TIME   Total Critical Care time was 0 minutes, excluding separately reportable procedures. There was a high probability of clinically significant/life threatening deterioration in the patient's condition which required my urgent intervention.       CONSULTS:  IP CONSULT TO NEUROSURGERY    PROCEDURES:  Unless otherwise noted below, none     Procedures    FINAL IMPRESSION      1. Chronic intractable headache, unspecified headache type          DISPOSITION/PLAN   DISPOSITION Decision To Discharge 08/17/2020 07:04:45 PM      PATIENT REFERREDTO:  Aisha Caputo MD  [address] [place] [address] [address] [facility] [address]   ED follow-up    Casise Pulliam MD  [address]  [phone]      ED follow-up for headache      DISCHARGE MEDICATIONS:  Discharge Medication List as of 8/17/2020  7:09 PM             The patient was discussed and seen with attending provider. Kole Lopez D.O.   [phone] Resident  PGY-3            Muriel Negro DO  Resident  08/17/20 5396

## 2020-08-19 PROBLEM — G62.9 POLYNEUROPATHY: Status: ACTIVE | Noted: 2020-01-01

## 2020-08-19 PROBLEM — G44.52 NEW PERSISTENT DAILY HEADACHE: Status: ACTIVE | Noted: 2020-01-01

## 2020-08-19 PROBLEM — G43.119 INTRACTABLE MIGRAINE WITH AURA WITHOUT STATUS MIGRAINOSUS: Status: ACTIVE | Noted: 2020-01-01

## 2020-08-19 NOTE — PROGRESS NOTES
The patient is a 64y.o. years old female who  was referred by CESAR Rosario CNP for consultation regarding new daily persistent headaches and possible recent stroke. HPI:  The patient describes new onset daily persistent headache since end of July. Degree is moderate and duration is persistent. Frequency is daily. Description is right-sided temporal and frontal dull achy pain which is constant. Occasional photophobia and phonophobia. No severe nausea or vomiting or visual changes. No weakness or numbness or tingling. No other triggers or other associated symptoms. No recent fever or recent change in her medications. She has history of chronic migraine with aura several years ago. She has not had any severe migraine for quite some time. She feels her current headache is somewhat different as it is more persistent. She tried over-the-counter medication without help. She tried Imitrex and recently Fioricet with mild to moderate relief. She does have chronic insomnia and she has history of anxiety. History of hypertension and valve replacement on Coumadin. She takes blood pressure medication. History of chronic neuropathic pain and she is on gabapentin 3 mg daily. Patient had a CT of the head few days ago which I did review today. CT head without contrast showed some right frontal hypoattenuation consistent with possible recent stroke. She was seen in the ED recently and had a CTA head and neck 2 days ago which showed no large vessel occlusion and possible intracranial small ICA aneurysm. The patient today denies any new symptoms. Other review system was unremarkable.     Family History   Problem Relation Age of Onset    Stroke Mother     Pacemaker Mother     Stroke Father     Heart Disease Father 61        MI    High Blood Pressure Father        Past Medical History:   Diagnosis Date    Anxiety     Anxiety     Aortic valve defect 2008    congenital,Ao replaced,Coronary angiogram= Normal    Depression     HTN (hypertension)     Hyperlipidemia     Hyperlipidemia     Hypertension     LONG TERM ANTICOAGULENT USE     Palpitations     Pneumonia     Psoriasis      Prior to Visit Medications    Medication Sig Taking? Authorizing Provider   amitriptyline (ELAVIL) 25 MG tablet Take 1 tablet by mouth nightly Yes Nicholas Rosa MD   butalbital-acetaminophen-caffeine (FIORICET, ESGIC) -40 MG per tablet TAKE 1 TABLET BY MOUTH EVERY 6 HOURS AS NEEDED FOR HEADACHES Yes John Padron MD   SUMAtriptan (IMITREX) 50 MG tablet Take 1 tablet by mouth once as needed for Migraine Yes CESAR Javed CNP   hydroCHLOROthiazide (HYDRODIURIL) 25 MG tablet TAKE 1 TABLET BY MOUTH DAILY Yes Kale Villalpando MD   simvastatin (ZOCOR) 40 MG tablet TAKE 1 TABLET BY MOUTH EVERY DAY AT NIGHT Yes Oscar Stains Day, MD   busPIRone (BUSPAR) 10 MG tablet TAKE ONE-HALF TABLET BY MOUTH TWICE A DAY FOR ANXIETY Yes Oscar Padron MD   aspirin 325 MG EC tablet TAKE 1 TABLET BY MOUTH EVERY DAY Yes John Padron MD   gabapentin (NEURONTIN) 300 MG capsule TAKE ONE CAPSULE BY MOUTH TWICE A DAY FOR NEUROPATHY Yes John Padron MD   metoprolol tartrate (LOPRESSOR) 25 MG tablet TAKE 1/2 TABLET BY MOUTH TWICE A DAY FOR HIGH BLOOD PRESSURE Yes CESAR Augustin CNP   potassium chloride (KLOR-CON M10) 10 MEQ extended release tablet Take 1 tablet by mouth daily Yes Kale Villalpando MD   warfarin (COUMADIN) 2 MG tablet TAKE 1 TABLET BY MOUTH DAILY Yes Kale Villalpando MD   MINIVELLE 0.05 MG/24HR  Yes Historical Provider, MD   albuterol (PROVENTIL HFA;VENTOLIN HFA) 108 (90 BASE) MCG/ACT inhaler Inhale 2 puffs into the lungs every 6 hours as needed for Wheezing.  Yes CESAR Truong CNP     No Known Allergies  Social History     Tobacco Use    Smoking status: Current Every Day Smoker     Packs/day: 1.00     Years: 20.00     Pack years: 20.00    Smokeless tobacco: Never Used   Substance Use Topics    Alcohol use: Yes     Comment: 2 x months     Past Surgical History:   Procedure Laterality Date    AORTIC VALVE REPLACEMENT       4 years ago    AORTIC VALVE REPLACEMENT      CARDIAC VALVE REPLACEMENT  9/25/2008    CERVICAL DISCECTOMY  2005    CERVICAL FUSION  2006    CERVICAL SPINE SURGERY Bilateral 1/7/2020    BILATERAL LUMBAR THREE, LUMBAR FOUR, LUMBAR FIVE MEDIAL BRANCH BLOCK SITE CONFIRMED BY FLUOROSCOPY performed by Ramakrishna De Anda MD at [facility] Bilateral 1/28/2020    BILATERAL LUMBAR THREE, FOUR, FIVE MEDIAL BRANCH BLOCK SITE CONFIRMED BY FLUOROSCOPY performed by Ramakrishna De Anda MD at [facility]  5/8/14    Tubular Adenoma. Internal Hemorrhoids. Redo 5 years.  HERNIA REPAIR      HYSTERECTOMY  1998    BSO    HYSTERECTOMY, TOTAL ABDOMINAL      KNEE ARTHROSCOPY  9/25/12    right partial medial menisectomy    LAPAROSCOPY  1997 & 1994    endometriasis    NASAL SEPTUM SURGERY  2005    NASAL SEPTUM SURGERY      NERVE SURGERY Bilateral 2/24/2020    BILATERAL LUMBAR THREE, FOUR, FIVE RADIOFREQUENCY ABLATION SITE CONFIRMED BY FLUOROSCOPY performed by Ramakrishna De Anda MD at [address] [facility]      descending artery repair       ROS : A 10-14 system review of constitutional, cardiovascular, respiratory, GI, eyes, , ENT, musculoskeletal, endocrine, skin, SHEENT, genitourinary, psychiatric and neurologic systems was obtained and updated today and is unremarkable except as mentioned in my HPI        Exam:   Constitutional:   Vitals:    08/19/20 1006   BP: 105/62   Pulse: (!) 48   Temp: 97.5 °F (36.4 °C)   TempSrc: Infrared   SpO2: 96%   Weight: 130 lb (59 kg)   Height: 5' 3\" (1.6 m)       General appearance:  Normal development and appear in no acute distress. Eye: No icterus. Fundus: Funduscopic examination cannot be performed due to COVID19 restrictions and precautions. Patient agreed.    Neck: supple  Cardiovascular: No lower leg edema with good pulsation. Mental Status:   Oriented to person, place, problem, and time. Memory: Good immediate recall. Intact remote memory  Normal attention span and concentration. Language: intact naming, repeating and fluency   Good fund of Knowledge. Aware of current events and vocabulary   Cranial Nerves:   II: Visual fields: Full to confrontation. Pupils: equal, round, reactive to light  III,IV,VI: Extra Ocular Movements are intact. No nystagmus  V: Facial sensation is intact  VII: Facial strength and movements: intact and symmetric  VIII: Hearing: Intact to finger rub bilaterally  IX: Palate elevation is symmetric  XI: Shoulder shrug is intact  XII: Tongue movements are normal  Musculoskeletal: 5/5 in all 4 extremities. Tone: Normal tone. Reflexes: Bilateral biceps 2/4, triceps 2/4, brachial radialis 2/4, knee 2/4 and ankle 2/4. Planters: flexor bilaterally. Coordination: no pronator drift, no dysmetria with FNF in upper extremities. Normal REM. Sensation: normal to all modalities in both arms and legs. Gait/Posture: steady gait and normal posturing and station. Medical decision making:  I personally reviewed and updated social history, past medical history, medications, allergy, surgical history, and family history as documented in the patient's electronic health records. Labs and/or neuroimaging and other test results reviewed and discussed with the patient. Reviewed notes from other physicians. Provided patient education regarding risk, benefits and treatment options as well as adherence to medication regimen and side effect from these medications. Assessment:  1. New persistent daily headache, persistent and severe. Not controlled. Rule out secondary versus primary headache. I reviewed the CT of the head which did show some right frontal hypoattenuation.   I will repeat CT of the head next month with contrast to rule out possibility

## 2020-08-19 NOTE — ED PROVIDER NOTES
Emergency Department Attending Provider Note  Location: [address] [place] [address]  ED  8/17/2020     Patient Identification  Berna Rausch is a 64 y.o. female      Berna Rausch was evaluated in the Emergency Department for intractable headache. This been persistent for over 2 weeks. Described as a constant superior bilateral headache. Moderate to severe. Not the worst headache she has had. Unclear onset she believes slow onset. She denies rapid onset. Different from previous headaches. Does not typically get headaches like this. She denies any vision changes no numbness weakness paralysis no neck pain or stiffness no fevers or rash. She denies any trauma. See multiple providers over the past week for the same symptoms. .  She is on Coumadin. Physical exam revealed:  Vital signs reviewed  Gen: Alert and oriented, no acute distress  HEENT: Normocephalic atraumatic, pupils equal round reactive to light, extraocular movements intact. There is no temporal tenderness to palpation. Card: RRR, no murmurs, equal radial pulses  Resp: CBSBL, no wheezes rales or rhonchi  Abd: Soft nontender, nondistended abdomen, no guarding or rebound, no CVA tenderness  Ext: No deformities noted  Neuro: Grossly normal moving extremities equally. NIH stroke scale 0. No cranial nerve deficits appreciated, strength 5 out of 5 all extremities, sensation intact, no central ataxia no cerebellar signs no deviation of vertical skew. Patient seen and evaluated. Relevant records reviewed. Here with persistent headache. States this that this is a moderate to severe and atypical headache for her. She has no focal deficits she has no neck pain or rigidity. Patient reports that her headache is severe however multiple reassessments by myself, AILEEN, nurse finding her resting comfortably on her cell phone laughing and joking with friends by phone. Labs without metabolic derangements.   Patient was treated with headache cocktail

## 2020-08-19 NOTE — LETTER
Robert Conde MD    [facility] Neurology  [address], [address] [place] [address]    [phone] (Phone)  [phone] (Fax)    Dear Dr Juanita Mann MD    I had the pleasure of seeing your patient Toma Geiger  1960 today. I have attached a detailed summary below:    The patient is a 64y.o. years old female who  was referred by CESAR Roy CNP for consultation regarding new daily persistent headaches and possible recent stroke. HPI:  The patient describes new onset daily persistent headache since end of July. Degree is moderate and duration is persistent. Frequency is daily. Description is right-sided temporal and frontal dull achy pain which is constant. Occasional photophobia and phonophobia. No severe nausea or vomiting or visual changes. No weakness or numbness or tingling. No other triggers or other associated symptoms. No recent fever or recent change in her medications. She has history of chronic migraine with aura several years ago. She has not had any severe migraine for quite some time. She feels her current headache is somewhat different as it is more persistent. She tried over-the-counter medication without help. She tried Imitrex and recently Fioricet with mild to moderate relief. She does have chronic insomnia and she has history of anxiety. History of hypertension and valve replacement on Coumadin. She takes blood pressure medication. History of chronic neuropathic pain and she is on gabapentin 3 mg daily. Patient had a CT of the head few days ago which I did review today. CT head without contrast showed some right frontal hypoattenuation consistent with possible recent stroke. She was seen in the ED recently and had a CTA head and neck 2 days ago which showed no large vessel occlusion and possible intracranial small ICA aneurysm. The patient today denies any new symptoms. Other review system was unremarkable. Family History   Problem Relation Age of Onset   [facility] Stroke Mother     Pacemaker Mother     Stroke Father     Heart Disease Father 61        MI    High Blood Pressure Father        Past Medical History:   Diagnosis Date    Anxiety     Anxiety     Aortic valve defect 2008    congenital,Ao replaced,Coronary angiogram= Normal    Depression     HTN (hypertension)     Hyperlipidemia     Hyperlipidemia     Hypertension     LONG TERM ANTICOAGULENT USE     Palpitations     Pneumonia     Psoriasis      Prior to Visit Medications    Medication Sig Taking?  Authorizing Provider   amitriptyline (ELAVIL) 25 MG tablet Take 1 tablet by mouth nightly Yes Ofelia Mosqueda MD   butalbital-acetaminophen-caffeine (FIORICET, ESGIC) -40 MG per tablet TAKE 1 TABLET BY MOUTH EVERY 6 HOURS AS NEEDED FOR HEADACHES Yes Tyler Padron MD   SUMAtriptan (IMITREX) 50 MG tablet Take 1 tablet by mouth once as needed for Migraine Yes CESAR Banerjee CNP   hydroCHLOROthiazide (HYDRODIURIL) 25 MG tablet TAKE 1 TABLET BY MOUTH DAILY Yes Luba Sidhu MD   simvastatin (ZOCOR) 40 MG tablet TAKE 1 TABLET BY MOUTH EVERY DAY AT NIGHT Yes Grace Padron MD   busPIRone (BUSPAR) 10 MG tablet TAKE ONE-HALF TABLET BY MOUTH TWICE A DAY FOR ANXIETY Yes Grace Padron MD   aspirin 325 MG EC tablet TAKE 1 TABLET BY MOUTH EVERY DAY Yes Tyler Padron MD   gabapentin (NEURONTIN) 300 MG capsule TAKE ONE CAPSULE BY MOUTH TWICE A DAY FOR NEUROPATHY Yes Tyler Padron MD   metoprolol tartrate (LOPRESSOR) 25 MG tablet TAKE 1/2 TABLET BY MOUTH TWICE A DAY FOR HIGH BLOOD PRESSURE Yes CESAR Pimentel CNP   potassium chloride (KLOR-CON M10) 10 MEQ extended release tablet Take 1 tablet by mouth daily Yes Luba Sidhu MD   warfarin (COUMADIN) 2 MG tablet TAKE 1 TABLET BY MOUTH DAILY Yes Luba Sidhu MD MINIVELLE 0.05 MG/24HR  Yes Historical Provider, MD   albuterol (PROVENTIL HFA;VENTOLIN HFA) 108 (90 BASE) MCG/ACT inhaler Inhale 2 puffs into the lungs every 6 hours as needed for Wheezing. Yes CESAR Dailey CNP     No Known Allergies  Social History     Tobacco Use    Smoking status: Current Every Day Smoker     Packs/day: 1.00     Years: 20.00     Pack years: 20.00    Smokeless tobacco: Never Used   Substance Use Topics    Alcohol use: Yes     Comment: 2 x months     Past Surgical History:   Procedure Laterality Date    AORTIC VALVE REPLACEMENT       4 years ago    AORTIC VALVE REPLACEMENT      CARDIAC VALVE REPLACEMENT  9/25/2008    CERVICAL DISCECTOMY  2005    CERVICAL FUSION  2006    CERVICAL SPINE SURGERY Bilateral 1/7/2020    BILATERAL LUMBAR THREE, LUMBAR FOUR, LUMBAR FIVE MEDIAL BRANCH BLOCK SITE CONFIRMED BY FLUOROSCOPY performed by Augustine Pulido MD at [address] [place] [address] Bilateral 1/28/2020    BILATERAL LUMBAR THREE, FOUR, FIVE MEDIAL BRANCH BLOCK SITE CONFIRMED BY FLUOROSCOPY performed by Augustine Pulido MD at [address] [place] [address]  5/8/14    Tubular Adenoma. Internal Hemorrhoids. Redo 5 years.     HERNIA REPAIR      HYSTERECTOMY  1998    BSO    HYSTERECTOMY, TOTAL ABDOMINAL      KNEE ARTHROSCOPY  9/25/12    right partial medial menisectomy    LAPAROSCOPY  1997 & 1994    endometriasis    NASAL SEPTUM SURGERY  2005    NASAL SEPTUM SURGERY      NERVE SURGERY Bilateral 2/24/2020    BILATERAL LUMBAR THREE, FOUR, FIVE RADIOFREQUENCY ABLATION SITE CONFIRMED BY FLUOROSCOPY performed by Augustine Pulido MD at [facility]      descending artery repair       ROS : A 10-14 system review of constitutional, cardiovascular, respiratory, GI, eyes, , ENT, musculoskeletal, endocrine, skin, SHEENT, genitourinary, psychiatric and neurologic systems was obtained and updated today and is unremarkable except as mentioned in my HPI        Exam:   Constitutional:   Vitals:    08/19/20 1006   BP: 105/62   Pulse: (!) 48   Temp: 97.5 °F (36.4 °C)   TempSrc: Infrared   SpO2: 96%   Weight: 130 lb (59 kg)   Height: 5' 3\" (1.6 m)       General appearance:  Normal development and appear in no acute distress. Eye: No icterus. Fundus: Funduscopic examination cannot be performed due to COVID19 restrictions and precautions. Patient agreed. Neck: supple  Cardiovascular: No lower leg edema with good pulsation. Mental Status:   Oriented to person, place, problem, and time. Memory: Good immediate recall. Intact remote memory  Normal attention span and concentration. Language: intact naming, repeating and fluency   Good fund of Knowledge. Aware of current events and vocabulary   Cranial Nerves:   II: Visual fields: Full to confrontation. Pupils: equal, round, reactive to light  III,IV,VI: Extra Ocular Movements are intact. No nystagmus  V: Facial sensation is intact  VII: Facial strength and movements: intact and symmetric  VIII: Hearing: Intact to finger rub bilaterally  IX: Palate elevation is symmetric  XI: Shoulder shrug is intact  XII: Tongue movements are normal  Musculoskeletal: 5/5 in all 4 extremities. Tone: Normal tone. Reflexes: Bilateral biceps 2/4, triceps 2/4, brachial radialis 2/4, knee 2/4 and ankle 2/4. Planters: flexor bilaterally. Coordination: no pronator drift, no dysmetria with FNF in upper extremities. Normal REM. Sensation: normal to all modalities in both arms and legs. Gait/Posture: steady gait and normal posturing and station. Medical decision making:  I personally reviewed and updated social history, past medical history, medications, allergy, surgical history, and family history as documented in the patient's electronic health records. Labs and/or neuroimaging and other test results reviewed and discussed with the patient.   Reviewed Continue gabapentin 300 mg bid    RTC in 6 weeks        Please do not hesitate to contact me, should you have any questions or concerns regarding the care of Joe Parham     Sincerely,    Mychal De Jesus MD    This dictation was generated by voice recognition computer software. Although all attempts are made to edit the dictation for accuracy, there may be errors in the transcription that are not intended.

## 2020-09-01 NOTE — TELEPHONE ENCOUNTER
From: Trudy Rossi  To: Dayana oLrd MD  Sent: 8/31/2020 12:07 PM EDT  Subject: Non-Urgent Medical Question    Do you ever see patients in the office? Just wondering because any time I call to see you, Mary Ram been told either that youre not seeing patients or that you are on emergency family leave. I really needed to see you on July 31. When I called the office they told me you werent there and that the doctor who was there was gone for the day. If you had been there, you might have been able to realize that I was having a stroke. Didnt really get to see you until August 10, for a few brief seconds on video visit. Apparently my blood pressure is too low now when I take my BP medicines. The neurologist is the only dr outside of the emergency room that I have been able to see in person and he is the one that feels the BP is too low.    Emmanuel Darden

## 2020-09-01 NOTE — TELEPHONE ENCOUNTER
Spoke with patient advised to contact Cardiologist and gave her appointment with Frankie Johansen NP for Tuesday 9/2/20

## 2020-09-02 NOTE — PROGRESS NOTES
09/02/20    Sonia Correa  64 y.o.  female    Chief Complaint   Patient presents with    Hypotension         HPI:   Pt presents to have blood pressure checked stating that when she went for neurology visit the  Was concerned about it being too low. She has been checking at home and keeping a log for him and will FU with him on OCt 1. She does not bring record for our view today. BP in the office on the right was 130/50 and on the left 140/86. A symptomatic in office today. BP (!) 140/86 Comment: left arm  Pulse 78   Temp 98 °F (36.7 °C)   Wt 129 lb (58.5 kg)   SpO2 95%   BMI 22.85 kg/m²        Current Outpatient Medications   Medication Sig Dispense Refill    amitriptyline (ELAVIL) 25 MG tablet Take 1 tablet by mouth nightly 30 tablet 2    butalbital-acetaminophen-caffeine (FIORICET, ESGIC) -40 MG per tablet TAKE 1 TABLET BY MOUTH EVERY 6 HOURS AS NEEDED FOR HEADACHES 20 tablet 0    hydroCHLOROthiazide (HYDRODIURIL) 25 MG tablet TAKE 1 TABLET BY MOUTH DAILY 90 tablet 3    simvastatin (ZOCOR) 40 MG tablet TAKE 1 TABLET BY MOUTH EVERY DAY AT NIGHT 90 tablet 3    busPIRone (BUSPAR) 10 MG tablet TAKE ONE-HALF TABLET BY MOUTH TWICE A DAY FOR ANXIETY 90 tablet 4    aspirin 325 MG EC tablet TAKE 1 TABLET BY MOUTH EVERY DAY 90 tablet 3    metoprolol tartrate (LOPRESSOR) 25 MG tablet TAKE 1/2 TABLET BY MOUTH TWICE A DAY FOR HIGH BLOOD PRESSURE 90 tablet 3    potassium chloride (KLOR-CON M10) 10 MEQ extended release tablet Take 1 tablet by mouth daily 180 tablet 2    warfarin (COUMADIN) 2 MG tablet TAKE 1 TABLET BY MOUTH DAILY 90 tablet 3    MINIVELLE 0.05 MG/24HR   11    albuterol (PROVENTIL HFA;VENTOLIN HFA) 108 (90 BASE) MCG/ACT inhaler Inhale 2 puffs into the lungs every 6 hours as needed for Wheezing.  1 Inhaler 3    SUMAtriptan (IMITREX) 50 MG tablet Take 1 tablet by mouth once as needed for Migraine 9 tablet 0    gabapentin (NEURONTIN) 300 MG capsule TAKE ONE CAPSULE BY MOUTH TWICE A DAY FOR NEUROPATHY 180 capsule 2     No current facility-administered medications for this visit.         Social History     Socioeconomic History    Marital status:      Spouse name: Not on file    Number of children: Not on file    Years of education: Not on file    Highest education level: Not on file   Occupational History    Not on file   Social Needs    Financial resource strain: Not on file    Food insecurity     Worry: Not on file     Inability: Not on file    Transportation needs     Medical: Not on file     Non-medical: Not on file   Tobacco Use    Smoking status: Current Every Day Smoker     Packs/day: 1.00     Years: 20.00     Pack years: 20.00    Smokeless tobacco: Never Used   Substance and Sexual Activity    Alcohol use: Yes     Comment: 2 x months    Drug use: Never    Sexual activity: Yes     Partners: Male   Lifestyle    Physical activity     Days per week: Not on file     Minutes per session: Not on file    Stress: Not on file   Relationships    Social connections     Talks on phone: Not on file     Gets together: Not on file     Attends Hindu service: Not on file     Active member of club or organization: Not on file     Attends meetings of clubs or organizations: Not on file     Relationship status: Not on file    Intimate partner violence     Fear of current or ex partner: Not on file     Emotionally abused: Not on file     Physically abused: Not on file     Forced sexual activity: Not on file   Other Topics Concern    Not on file   Social History Narrative    Not on file       Family History   Problem Relation Age of Onset    Stroke Mother     Pacemaker Mother     Stroke Father     Heart Disease Father 61        MI    High Blood Pressure Father        Past Medical History:   Diagnosis Date    Anxiety     Anxiety     Aortic valve defect 2008    congenital,Ao replaced,Coronary angiogram= Normal    Depression     HTN (hypertension)     Hyperlipidemia     Hyperlipidemia     Hypertension     LONG TERM ANTICOAGULENT USE     Palpitations     Pneumonia     Psoriasis        Review of Systems   Constitutional: Negative for fever. HENT: Negative for congestion. Eyes: Negative for discharge. Respiratory: Negative for cough, shortness of breath and wheezing. Cardiovascular: Negative for chest pain, palpitations and leg swelling. Gastrointestinal: Negative for abdominal pain, blood in stool, constipation, diarrhea, nausea and vomiting. Genitourinary: Negative for dysuria and hematuria. Musculoskeletal: Negative for myalgias. Skin: Negative for rash. Neurological: Negative for dizziness. Psychiatric/Behavioral: The patient is not nervous/anxious. Physical Exam  Constitutional:       General: She is not in acute distress. Appearance: She is not diaphoretic. HENT:      Right Ear: External ear normal.      Left Ear: External ear normal.      Mouth/Throat:      Pharynx: No oropharyngeal exudate. Eyes:      General: No scleral icterus. Right eye: No discharge. Left eye: No discharge. Neck:      Musculoskeletal: Normal range of motion. Thyroid: No thyromegaly. Cardiovascular:      Rate and Rhythm: Normal rate and regular rhythm. Heart sounds: Normal heart sounds. No murmur. No friction rub. No gallop. Pulmonary:      Effort: Pulmonary effort is normal.      Breath sounds: Normal breath sounds. No wheezing. Abdominal:      General: Bowel sounds are normal. There is no distension. Palpations: Abdomen is soft. Tenderness: There is no abdominal tenderness. Musculoskeletal: Normal range of motion. General: No tenderness. Lymphadenopathy:      Cervical: No cervical adenopathy. Skin:     General: Skin is warm and dry. Findings: No erythema or rash. Neurological:      Mental Status: She is alert and oriented to person, place, and time.    Psychiatric:

## 2020-09-18 NOTE — PROGRESS NOTES
does not use drugs. Family History:  family history includes Heart Disease (age of onset: 61) in her father; High Blood Pressure in her father; Pacemaker in her mother; Stroke in her father and mother. Home Medications:  Prior to Admission medications    Medication Sig Start Date End Date Taking? Authorizing Provider   amitriptyline (ELAVIL) 25 MG tablet Take 1 tablet by mouth nightly 8/19/20  Yes Thao Fierro MD   butalbital-acetaminophen-caffeine (FIORICET, ESGIC) -40 MG per tablet TAKE 1 TABLET BY MOUTH EVERY 6 HOURS AS NEEDED FOR HEADACHES 8/13/20  Yes Claudia Padron MD   hydroCHLOROthiazide (HYDRODIURIL) 25 MG tablet TAKE 1 TABLET BY MOUTH DAILY 4/8/20  Yes Jason Amin MD   simvastatin (ZOCOR) 40 MG tablet TAKE 1 TABLET BY MOUTH EVERY DAY AT NIGHT 3/9/20  Yes Claudia Padron MD   busPIRone (BUSPAR) 10 MG tablet TAKE ONE-HALF TABLET BY MOUTH TWICE A DAY FOR ANXIETY 1/22/20  Yes Claudia Padron MD   aspirin 325 MG EC tablet TAKE 1 TABLET BY MOUTH EVERY DAY 1/2/20  Yes Claudia Padron MD   metoprolol tartrate (LOPRESSOR) 25 MG tablet TAKE 1/2 TABLET BY MOUTH TWICE A DAY FOR HIGH BLOOD PRESSURE 9/9/19  Yes CESAR Li Asp, CNP   potassium chloride (KLOR-CON M10) 10 MEQ extended release tablet Take 1 tablet by mouth daily 8/23/19  Yes Jason Amin MD   warfarin (COUMADIN) 2 MG tablet TAKE 1 TABLET BY MOUTH DAILY 7/25/19  Yes Jason Amin MD   MINIVELLE 0.05 MG/24HR  1/16/16  Yes Historical Provider, MD   albuterol (PROVENTIL HFA;VENTOLIN HFA) 108 (90 BASE) MCG/ACT inhaler Inhale 2 puffs into the lungs every 6 hours as needed for Wheezing. 4/17/14  Yes CESAR Hayes CNP   SUMAtriptan (IMITREX) 50 MG tablet TAKE 1 TABLET BY MOUTH ONCE AS NEEDED FOR MIGRAINE 9/6/20 9/6/20  Claudia Padron MD   gabapentin (NEURONTIN) 300 MG capsule TAKE ONE CAPSULE BY MOUTH TWICE A DAY FOR NEUROPATHY 12/8/19 8/19/20  Claudia Padron MD        Allergies:  Patient has no known allergies. Review of Systems:   · Constitutional: there has been no unanticipated weight loss. There's been no change in energy level, sleep pattern, or activity level. · Eyes: No visual changes or diplopia. No scleral icterus. · ENT: No Headaches, hearing loss or vertigo. No mouth sores or sore throat. · Cardiovascular: Reviewed in HPI  · Respiratory: No cough or wheezing, no sputum production. No hematemesis. · Gastrointestinal: No abdominal pain, appetite loss, blood in stools. No change in bowel or bladder habits. · Genitourinary: No dysuria, trouble voiding, or hematuria. · Musculoskeletal:  No gait disturbance, weakness or joint complaints. · Integumentary: No rash or pruritis. · Neurological: No headache, diplopia, change in muscle strength, numbness or tingling. No change in gait, balance, coordination, mood, affect, memory, mentation, behavior. · Psychiatric: No anxiety, no depression. · Endocrine: No malaise, fatigue or temperature intolerance. No excessive thirst, fluid intake, or urination. No tremor. · Hematologic/Lymphatic: No abnormal bruising or bleeding, blood clots or swollen lymph nodes. · Allergic/Immunologic: No nasal congestion or hives. Physical Examination:    Vitals:    09/28/20 1317   BP: (!) 144/86   Pulse:    SpO2:         Constitutional and General Appearance: NAD   Respiratory:  · Normal excursion and expansion without use of accessory muscles  · Resp Auscultation: Normal breath sounds without dullness  Cardiovascular:  · The apical impulses not displaced  · Heart tones are crisp and normal  · Cervical veins are not engorged  · The carotid upstroke is normal in amplitude and contour without delay or bruit  · Normal S1S2, No S3, 3/6 systolic murmur, crisp mechanical heart sounds   · Peripheral pulses are symmetrical and full  · There is no clubbing, cyanosis of the extremities.   · No edema  · Femoral Arteries: 2+ and equal  · Pedal Pulses: 2+ and equal   Abdomen:  · No masses or tenderness  · Liver/Spleen: No Abnormalities Noted  Neurological/Psychiatric:  · Alert and oriented in all spheres  · Moves all extremities well  · Exhibits normal gait balance and coordination  · No abnormalities of mood, affect, memory, mentation, or behavior are noted    Echo 3/2016  Normal left ventricular systolic function with an estimated ejection  fraction of 55%. No regional wall motion abnormalities are seen. Elevated left ventricular diastolic filling pressure ( Septal E/e' 17). Mild thickening of mitral leaflets. Normally functioning mechanical prosthetic valve in aortic position. Doppler  parameters are normal for the valve. Holter 3/2015  PVC, PAC    BARON 2019  No significant arrhythmias      Echocardiogram 3/2019   Summary   Normal left ventricle systolic function with an estimated ejection fraction   of 55%. No regional wall motion abnormalities are seen. Grade I diastolic dysfunction with normal filing pressure. Normally functioning mechanical valve in aortic position appears well   seated. Doppler parameters are normal for the valve . Mild aortic regurgitation. Mild mitral regurgitation. Systolic pulmonary artery pressure (SPAP) is normal and estimated at 27mmHg   (right atrial pressure 3 mmHg). Assessment:     1. H/O aortic valve replacement, mechanical, 2008. (prior bicuspid w/ AS). Stable. Echo reviewed. 2. HTN (hypertension) - suboptimal   3. Hyperlipidemia - controlled. Discussed results along w/ diet and exercise   4. Anticoagulant long-term use    5. Dizziness - doubt cardiac. No recent   6. Aortic valve disease    7. Palpitations - mild, stable. No sig recently   8. Smoking - cessation discussed  Possible recent CVA - as on chronic AC, PAF as etiology less likely. No symptoms PAF. Monitor unlikely to alter management .        Plan:  Smoking cessation encouraged   Start Norvasc 5 mg daily  Increase Metoprolol to 25 mg twice a day   Continue other medications

## 2020-09-28 NOTE — LETTER
use. She reports that she does not use drugs. Family History:  family history includes Heart Disease (age of onset: 61) in her father; High Blood Pressure in her father; Pacemaker in her mother; Stroke in her father and mother. Home Medications:  Prior to Admission medications    Medication Sig Start Date End Date Taking? Authorizing Provider   amitriptyline (ELAVIL) 25 MG tablet Take 1 tablet by mouth nightly 8/19/20  Yes Genevieve Burger MD   butalbital-acetaminophen-caffeine (FIORICET, ESGIC) -40 MG per tablet TAKE 1 TABLET BY MOUTH EVERY 6 HOURS AS NEEDED FOR HEADACHES 8/13/20  Yes Marlene Padron MD   hydroCHLOROthiazide (HYDRODIURIL) 25 MG tablet TAKE 1 TABLET BY MOUTH DAILY 4/8/20  Yes Jessie Santacruz MD   simvastatin (ZOCOR) 40 MG tablet TAKE 1 TABLET BY MOUTH EVERY DAY AT NIGHT 3/9/20  Yes Marlene Padron MD   busPIRone (BUSPAR) 10 MG tablet TAKE ONE-HALF TABLET BY MOUTH TWICE A DAY FOR ANXIETY 1/22/20  Yes Marlene Padron MD   aspirin 325 MG EC tablet TAKE 1 TABLET BY MOUTH EVERY DAY 1/2/20  Yes Marlene Padron MD   metoprolol tartrate (LOPRESSOR) 25 MG tablet TAKE 1/2 TABLET BY MOUTH TWICE A DAY FOR HIGH BLOOD PRESSURE 9/9/19  Yes CESAR Phan CNP   potassium chloride (KLOR-CON M10) 10 MEQ extended release tablet Take 1 tablet by mouth daily 8/23/19  Yes Jessie Santacruz MD   warfarin (COUMADIN) 2 MG tablet TAKE 1 TABLET BY MOUTH DAILY 7/25/19  Yes Jessie Santacruz MD   MINIVELLE 0.05 MG/24HR  1/16/16  Yes Historical Provider, MD   albuterol (PROVENTIL HFA;VENTOLIN HFA) 108 (90 BASE) MCG/ACT inhaler Inhale 2 puffs into the lungs every 6 hours as needed for Wheezing.  4/17/14  Yes CESAR Davey CNP   SUMAtriptan (IMITREX) 50 MG tablet TAKE 1 TABLET BY MOUTH ONCE AS NEEDED FOR MIGRAINE 9/6/20 9/6/20  Marlene Padron MD   gabapentin (NEURONTIN) 300 MG capsule TAKE ONE CAPSULE BY MOUTH TWICE A DAY FOR NEUROPATHY 12/8/19 8/19/20  Marlene Padron MD        Allergies: Patient has no known allergies. Review of Systems:   · Constitutional: there has been no unanticipated weight loss. There's been no change in energy level, sleep pattern, or activity level. · Eyes: No visual changes or diplopia. No scleral icterus. · ENT: No Headaches, hearing loss or vertigo. No mouth sores or sore throat. · Cardiovascular: Reviewed in HPI  · Respiratory: No cough or wheezing, no sputum production. No hematemesis. · Gastrointestinal: No abdominal pain, appetite loss, blood in stools. No change in bowel or bladder habits. · Genitourinary: No dysuria, trouble voiding, or hematuria. · Musculoskeletal:  No gait disturbance, weakness or joint complaints. · Integumentary: No rash or pruritis. · Neurological: No headache, diplopia, change in muscle strength, numbness or tingling. No change in gait, balance, coordination, mood, affect, memory, mentation, behavior. · Psychiatric: No anxiety, no depression. · Endocrine: No malaise, fatigue or temperature intolerance. No excessive thirst, fluid intake, or urination. No tremor. · Hematologic/Lymphatic: No abnormal bruising or bleeding, blood clots or swollen lymph nodes. · Allergic/Immunologic: No nasal congestion or hives. Physical Examination:    Vitals:    09/28/20 1317   BP: (!) 144/86   Pulse:    SpO2:         Constitutional and General Appearance: NAD   Respiratory:  · Normal excursion and expansion without use of accessory muscles  · Resp Auscultation: Normal breath sounds without dullness  Cardiovascular:  · The apical impulses not displaced  · Heart tones are crisp and normal  · Cervical veins are not engorged  · The carotid upstroke is normal in amplitude and contour without delay or bruit  · Normal S1S2, No S3, 3/6 systolic murmur, crisp mechanical heart sounds   · Peripheral pulses are symmetrical and full  · There is no clubbing, cyanosis of the extremities.   · No edema  · Femoral Arteries: 2+ and equal · Pedal Pulses: 2+ and equal   Abdomen:  · No masses or tenderness  · Liver/Spleen: No Abnormalities Noted  Neurological/Psychiatric:  · Alert and oriented in all spheres  · Moves all extremities well  · Exhibits normal gait balance and coordination  · No abnormalities of mood, affect, memory, mentation, or behavior are noted    Echo 3/2016  Normal left ventricular systolic function with an estimated ejection  fraction of 55%. No regional wall motion abnormalities are seen. Elevated left ventricular diastolic filling pressure ( Septal E/e' 17). Mild thickening of mitral leaflets. Normally functioning mechanical prosthetic valve in aortic position. Doppler  parameters are normal for the valve. Holter 3/2015  PVC, PAC    BARON 2019  No significant arrhythmias      Echocardiogram 3/2019   Summary   Normal left ventricle systolic function with an estimated ejection fraction   of 55%. No regional wall motion abnormalities are seen. Grade I diastolic dysfunction with normal filing pressure. Normally functioning mechanical valve in aortic position appears well   seated. Doppler parameters are normal for the valve . Mild aortic regurgitation. Mild mitral regurgitation. Systolic pulmonary artery pressure (SPAP) is normal and estimated at 27mmHg   (right atrial pressure 3 mmHg). Assessment:     1. H/O aortic valve replacement, mechanical, 2008. (prior bicuspid w/ AS). Stable. Echo reviewed. 2. HTN (hypertension) - suboptimal   3. Hyperlipidemia - controlled. Discussed results along w/ diet and exercise   4. Anticoagulant long-term use    5. Dizziness - doubt cardiac. No recent   6. Aortic valve disease    7. Palpitations - mild, stable. No sig recently   8. Smoking - cessation discussed  Possible recent CVA - as on chronic AC, PAF as etiology less likely. No symptoms PAF. Monitor unlikely to alter management .        Plan:  Smoking cessation encouraged   Start Norvasc 5 mg daily

## 2020-10-02 ENCOUNTER — CLINICAL DOCUMENTATION (OUTPATIENT)
Dept: INTERNAL MEDICINE CLINIC | Age: 60
End: 2020-10-02

## 2020-10-02 NOTE — PROGRESS NOTES
Received Coroners Inquiry for pts death. They needed the last 3 office notes. The surgeons notes Lee Toribio said they would get from the surgeon. She Bridger at the coroners office said that all of out paperwork was coming through as we spoke. They will call next week if they need anything else.

## 2021-09-27 NOTE — PROGRESS NOTES
10/30/17: INR is 3.4. Per protocol, no changes. Continue 2 mg daily. Recheck 2 week(s).  Provider notified. ~ Lexus Mcrae RN room air
